# Patient Record
Sex: FEMALE | Race: BLACK OR AFRICAN AMERICAN | Employment: FULL TIME | URBAN - METROPOLITAN AREA
[De-identification: names, ages, dates, MRNs, and addresses within clinical notes are randomized per-mention and may not be internally consistent; named-entity substitution may affect disease eponyms.]

---

## 2018-01-22 ENCOUNTER — OFFICE VISIT (OUTPATIENT)
Dept: ENDOCRINOLOGY | Age: 58
End: 2018-01-22

## 2018-01-22 VITALS
OXYGEN SATURATION: 98 % | SYSTOLIC BLOOD PRESSURE: 106 MMHG | DIASTOLIC BLOOD PRESSURE: 72 MMHG | BODY MASS INDEX: 36.71 KG/M2 | HEART RATE: 82 BPM | WEIGHT: 242.2 LBS | HEIGHT: 68 IN

## 2018-01-22 DIAGNOSIS — E04.9 GOITER: ICD-10-CM

## 2018-01-22 DIAGNOSIS — E06.3 HASHIMOTO'S THYROIDITIS: ICD-10-CM

## 2018-01-22 DIAGNOSIS — E11.40 TYPE 2 DIABETES, CONTROLLED, WITH NEUROPATHY (HCC): Primary | ICD-10-CM

## 2018-01-22 DIAGNOSIS — I10 ESSENTIAL HYPERTENSION: ICD-10-CM

## 2018-01-22 DIAGNOSIS — E78.2 MIXED HYPERLIPIDEMIA: ICD-10-CM

## 2018-01-22 DIAGNOSIS — E55.9 VITAMIN D DEFICIENCY: ICD-10-CM

## 2018-01-22 DIAGNOSIS — E03.9 ACQUIRED HYPOTHYROIDISM: ICD-10-CM

## 2018-01-22 PROCEDURE — 99204 OFFICE O/P NEW MOD 45 MIN: CPT | Performed by: INTERNAL MEDICINE

## 2018-01-22 RX ORDER — PANTOPRAZOLE SODIUM 40 MG/1
40 TABLET, DELAYED RELEASE ORAL DAILY
COMMUNITY
Start: 2017-08-09 | End: 2019-02-01 | Stop reason: ALTCHOICE

## 2018-01-22 RX ORDER — MULTIVIT-MIN/IRON/FOLIC ACID/K 18-600-40
CAPSULE ORAL
COMMUNITY
End: 2018-10-31 | Stop reason: SDUPTHER

## 2018-01-22 RX ORDER — TRIAMCINOLONE ACETONIDE 1 MG/G
CREAM TOPICAL 2 TIMES DAILY
COMMUNITY

## 2018-01-22 RX ORDER — HYDROXYCHLOROQUINE SULFATE 200 MG/1
200 TABLET, FILM COATED ORAL 2 TIMES DAILY
COMMUNITY

## 2018-01-22 RX ORDER — EZETIMIBE 10 MG/1
10 TABLET ORAL DAILY
COMMUNITY
End: 2018-01-22 | Stop reason: SDUPTHER

## 2018-01-22 RX ORDER — LISINOPRIL AND HYDROCHLOROTHIAZIDE 25; 20 MG/1; MG/1
1 TABLET ORAL DAILY
COMMUNITY
Start: 2017-09-28 | End: 2022-04-28

## 2018-01-22 RX ORDER — LEVOTHYROXINE SODIUM 0.03 MG/1
25 TABLET ORAL DAILY
COMMUNITY
Start: 2015-12-31 | End: 2018-01-22 | Stop reason: SDUPTHER

## 2018-01-22 RX ORDER — LEVOTHYROXINE SODIUM 25 MCG
25 TABLET ORAL
Qty: 90 TABLET | Refills: 1 | Status: SHIPPED | OUTPATIENT
Start: 2018-01-22 | End: 2018-04-30 | Stop reason: SDUPTHER

## 2018-01-22 RX ORDER — EZETIMIBE 10 MG/1
10 TABLET ORAL DAILY
Qty: 90 TABLET | Refills: 1 | Status: SHIPPED | OUTPATIENT
Start: 2018-01-22 | End: 2018-04-30 | Stop reason: SDUPTHER

## 2018-01-22 RX ORDER — GABAPENTIN 600 MG/1
600 TABLET ORAL 3 TIMES DAILY
COMMUNITY
Start: 2010-08-30

## 2018-01-22 RX ORDER — TRAMADOL HYDROCHLORIDE 50 MG/1
50 TABLET ORAL PRN
COMMUNITY
Start: 2018-01-02

## 2018-01-22 ASSESSMENT — PATIENT HEALTH QUESTIONNAIRE - PHQ9
1. LITTLE INTEREST OR PLEASURE IN DOING THINGS: 0
SUM OF ALL RESPONSES TO PHQ QUESTIONS 1-9: 0
2. FEELING DOWN, DEPRESSED OR HOPELESS: 0
SUM OF ALL RESPONSES TO PHQ9 QUESTIONS 1 & 2: 0

## 2018-01-22 NOTE — PROGRESS NOTES
normal  (minimum of 5 random plantar locations tested, avoiding callused areas - > 1 area with absence of sensation is + for neuropathy)    Plus at least one of the following:  Pulses: normal,   Pinprick: N/A  Proprioception: Intact  Vibration (128 Hz): Impaired    ASSESSMENT/PLAN:  1. Type 2 diabetes, controlled, with neuropathy (HCC)    - insulin lispro (HUMALOG) 100 UNIT/ML injection vial; In insulin pump, total daily dose 150 units  Dispense: 15 vial; Refill: 1  - Hemoglobin A1C; Future  - HM DIABETES FOOT EXAM  - Comprehensive Metabolic Panel; Future  - Microalbumin / Creatinine Urine Ratio; Future  - Comprehensive Metabolic Panel; Future  - Hemoglobin A1C; Future    2. Mixed hyperlipidemia    - ezetimibe (ZETIA) 10 MG tablet; Take 1 tablet by mouth daily  Dispense: 90 tablet; Refill: 1  - Lipid Panel; Future  - Lipid Panel; Future    3. Hashimoto's thyroiditis  Follow    4. Goiter  Thyroid sono    5. Vitamin D deficiency  Supplements. - Cholecalciferol (VITAMIN D) 2000 units CAPS capsule; Take by mouth  - Vitamin D 25 Hydroxy; Future  - Vitamin D 25 Hydroxy; Future    6. Essential hypertension    - lisinopril-hydrochlorothiazide (PRINZIDE;ZESTORETIC) 20-25 MG per tablet; Take 1 tablet by mouth daily    7. Class 2 obesity with serious comorbidity and body mass index (BMI) of 36.0 to 36.9 in adult, unspecified obesity type  Diet, exercise    8. Acquired hypothyroidism    - SYNTHROID 25 MCG tablet; Take 1 tablet by mouth every morning (before breakfast)  Dispense: 90 tablet; Refill: 1  - T4, Free; Future  - TSH without Reflex; Future      Reviewed and/or ordered clinical lab results Yes  Reviewed and/or ordered radiology tests Yes  Reviewed and/or ordered other diagnostic tests No  Discussed test results with performing physician No  Independently reviewed image, tracing, or specimen Yes Insulin pump data, settings, tracing. Continue same rates and ratios.   Made a decision to obtain old records No  Reviewed old

## 2018-01-31 ENCOUNTER — TELEPHONE (OUTPATIENT)
Dept: ENDOCRINOLOGY | Age: 58
End: 2018-01-31

## 2018-01-31 NOTE — TELEPHONE ENCOUNTER
Mrs. Miranda Benavides would like RX for Novolog and stated her plan is the same and will cover Novolog.

## 2018-01-31 NOTE — TELEPHONE ENCOUNTER
Pt called said  went to  her novolog from [id number] and was told that there isn't an order for Novolog but for something else. She said she was just in to see Dr. Xavier Gardner and was not told of any change to her medication.  She would like an order for Novolog sent to [facility], if she is needed she can be reached at [phone]

## 2018-04-30 ENCOUNTER — OFFICE VISIT (OUTPATIENT)
Dept: ENDOCRINOLOGY | Age: 58
End: 2018-04-30

## 2018-04-30 VITALS
HEART RATE: 88 BPM | DIASTOLIC BLOOD PRESSURE: 74 MMHG | HEIGHT: 68 IN | SYSTOLIC BLOOD PRESSURE: 124 MMHG | BODY MASS INDEX: 36.49 KG/M2 | OXYGEN SATURATION: 95 % | WEIGHT: 240.8 LBS

## 2018-04-30 DIAGNOSIS — I10 ESSENTIAL HYPERTENSION: ICD-10-CM

## 2018-04-30 DIAGNOSIS — E55.9 VITAMIN D DEFICIENCY: ICD-10-CM

## 2018-04-30 DIAGNOSIS — E78.2 MIXED HYPERLIPIDEMIA: ICD-10-CM

## 2018-04-30 DIAGNOSIS — E06.3 HASHIMOTO'S THYROIDITIS: ICD-10-CM

## 2018-04-30 DIAGNOSIS — E03.9 ACQUIRED HYPOTHYROIDISM: ICD-10-CM

## 2018-04-30 DIAGNOSIS — E04.9 GOITER: ICD-10-CM

## 2018-04-30 DIAGNOSIS — E11.40 TYPE 2 DIABETES, CONTROLLED, WITH NEUROPATHY (HCC): Primary | ICD-10-CM

## 2018-04-30 PROCEDURE — 99214 OFFICE O/P EST MOD 30 MIN: CPT | Performed by: INTERNAL MEDICINE

## 2018-04-30 RX ORDER — LEVOTHYROXINE SODIUM 50 MCG
50 TABLET ORAL
Qty: 90 TABLET | Refills: 1 | Status: SHIPPED | OUTPATIENT
Start: 2018-04-30 | End: 2018-07-30 | Stop reason: SDUPTHER

## 2018-04-30 RX ORDER — EZETIMIBE 10 MG/1
10 TABLET ORAL DAILY
Qty: 90 TABLET | Refills: 1 | Status: SHIPPED | OUTPATIENT
Start: 2018-04-30 | End: 2018-07-30 | Stop reason: SDUPTHER

## 2018-04-30 RX ORDER — LEVOTHYROXINE SODIUM 25 MCG
25 TABLET ORAL
Qty: 90 TABLET | Refills: 1 | Status: SHIPPED | OUTPATIENT
Start: 2018-04-30 | End: 2018-04-30

## 2018-04-30 ASSESSMENT — PATIENT HEALTH QUESTIONNAIRE - PHQ9
2. FEELING DOWN, DEPRESSED OR HOPELESS: 0
1. LITTLE INTEREST OR PLEASURE IN DOING THINGS: 0
SUM OF ALL RESPONSES TO PHQ QUESTIONS 1-9: 0
SUM OF ALL RESPONSES TO PHQ9 QUESTIONS 1 & 2: 0

## 2018-06-28 ENCOUNTER — TELEPHONE (OUTPATIENT)
Dept: ENDOCRINOLOGY | Age: 58
End: 2018-06-28

## 2018-06-28 DIAGNOSIS — E11.40 TYPE 2 DIABETES, CONTROLLED, WITH NEUROPATHY (HCC): ICD-10-CM

## 2018-06-29 DIAGNOSIS — E11.40 TYPE 2 DIABETES, CONTROLLED, WITH NEUROPATHY (HCC): ICD-10-CM

## 2018-06-29 RX ORDER — PERPHENAZINE 16 MG/1
1 TABLET, FILM COATED ORAL
Qty: 550 EACH | Refills: 3 | Status: SHIPPED | OUTPATIENT
Start: 2018-06-29 | End: 2019-02-01 | Stop reason: SDUPTHER

## 2018-07-30 ENCOUNTER — OFFICE VISIT (OUTPATIENT)
Dept: ENDOCRINOLOGY | Age: 58
End: 2018-07-30

## 2018-07-30 VITALS
OXYGEN SATURATION: 99 % | WEIGHT: 237 LBS | BODY MASS INDEX: 35.92 KG/M2 | DIASTOLIC BLOOD PRESSURE: 74 MMHG | HEIGHT: 68 IN | HEART RATE: 94 BPM | SYSTOLIC BLOOD PRESSURE: 110 MMHG

## 2018-07-30 DIAGNOSIS — E11.40 TYPE 2 DIABETES, CONTROLLED, WITH NEUROPATHY (HCC): Primary | ICD-10-CM

## 2018-07-30 DIAGNOSIS — E55.9 VITAMIN D DEFICIENCY: ICD-10-CM

## 2018-07-30 DIAGNOSIS — E03.9 ACQUIRED HYPOTHYROIDISM: ICD-10-CM

## 2018-07-30 DIAGNOSIS — I10 ESSENTIAL HYPERTENSION: ICD-10-CM

## 2018-07-30 DIAGNOSIS — E04.9 GOITER: ICD-10-CM

## 2018-07-30 DIAGNOSIS — E06.3 HASHIMOTO'S THYROIDITIS: ICD-10-CM

## 2018-07-30 DIAGNOSIS — E78.2 MIXED HYPERLIPIDEMIA: ICD-10-CM

## 2018-07-30 PROCEDURE — 99214 OFFICE O/P EST MOD 30 MIN: CPT | Performed by: INTERNAL MEDICINE

## 2018-07-30 RX ORDER — NORTRIPTYLINE HYDROCHLORIDE 10 MG/1
40 CAPSULE ORAL NIGHTLY
COMMUNITY

## 2018-07-30 RX ORDER — LEVOTHYROXINE SODIUM 50 MCG
50 TABLET ORAL
Qty: 90 TABLET | Refills: 1 | Status: SHIPPED | OUTPATIENT
Start: 2018-07-30 | End: 2018-10-31 | Stop reason: SDUPTHER

## 2018-07-30 RX ORDER — SIMVASTATIN 20 MG
20 TABLET ORAL EVERY EVENING
Qty: 30 TABLET | Refills: 3
Start: 2018-07-30 | End: 2019-05-03 | Stop reason: SDUPTHER

## 2018-07-30 RX ORDER — PHENTERMINE HYDROCHLORIDE 37.5 MG/1
37.5 TABLET ORAL
Qty: 30 TABLET | Refills: 0 | Status: SHIPPED | OUTPATIENT
Start: 2018-07-30 | End: 2018-08-29

## 2018-07-30 RX ORDER — EZETIMIBE 10 MG/1
10 TABLET ORAL DAILY
Qty: 90 TABLET | Refills: 1 | Status: SHIPPED | OUTPATIENT
Start: 2018-07-30 | End: 2019-02-01 | Stop reason: SDUPTHER

## 2018-07-30 ASSESSMENT — PATIENT HEALTH QUESTIONNAIRE - PHQ9
SUM OF ALL RESPONSES TO PHQ9 QUESTIONS 1 & 2: 0
SUM OF ALL RESPONSES TO PHQ QUESTIONS 1-9: 0
2. FEELING DOWN, DEPRESSED OR HOPELESS: 0
1. LITTLE INTEREST OR PLEASURE IN DOING THINGS: 0

## 2018-07-30 NOTE — PROGRESS NOTES
echotexture.  No discrete nodules or cysts seen.        The left thyroid lobe measures 5.0 x 1.5 x 1.2 cm and demonstrates homogeneous echotexture.  No discrete nodules or cysts seen.        Thyroid isthmus measures 0.2 cm and demonstrates homogeneous echotexture.  No discrete nodules or cysts seen.        No cervical lymphadenopathy noted. Past Medical History:   Diagnosis Date    Asthma     Depression     Fibromyalgia     HLP (hyperkeratosis lenticularis perstans)     Hypertension     Muscle weakness     Neuropathy (HCC)     Type 2 diabetes mellitus without complication (HCC)     Weight gain       Patient Active Problem List   Diagnosis    Type 2 diabetes, controlled, with neuropathy ([address]    Mixed hyperlipidemia    Hashimoto's thyroiditis    Goiter    Vitamin D deficiency    Essential hypertension    Class 2 obesity with serious comorbidity and body mass index (BMI) of 36.0 to 36.9 in adult    Acquired hypothyroidism     Past Surgical History:   Procedure Laterality Date    COLONOSCOPY      DILATION AND CURETTAGE OF UTERUS      ERCP      URETER STENT PLACEMENT       Social History     Social History    Marital status:      Spouse name: N/A    Number of children: N/A    Years of education: N/A     Occupational History    Not on file.      Social History Main Topics    Smoking status: Former Smoker     Years: 20.00     Types: Cigarettes    Smokeless tobacco: Never Used    Alcohol use 1.2 oz/week     2 Glasses of wine per week    Drug use: No    Sexual activity: Yes     Partners: Male     Other Topics Concern    Not on file     Social History Narrative    No narrative on file     Family History   Problem Relation Age of Onset    Heart Disease Mother     Diabetes Mother     Other Father 66        Lymphoma    Diabetes Sister     Other Sister         HLP    High Blood Pressure Brother     No Known Problems Brother     Other Brother 52        Suicide    Asthma throat, no earache, no decrease in hearing, no hoarseness, no dry mouth, no sinus problems, no difficulty swallowing, no neck lumps, no dental problems, no mouth sores, no ringing in ears  Pulmonary: no shortness of breath, no wheezing, no dyspnea on exertion, no cough  Cardiovascular: no chest pain, no lower extremity edema, no orthopnea, no intermittent leg claudication, no palpitations  Gastrointestinal: no abdominal pain, no nausea, no vomiting, no diarrhea, no constipation, no dysphagia, no heartburn, no bloating  Genitourinary: no dysuria, no urinary incontinence, no urinary hesitancy, no urinary frequency, no feelings of urinary urgency, no nocturia  Musculoskeletal: has joint swelling, has joint stiffness, has joint pain, no muscle cramps, has muscle pain  Integument/Breast: no hair loss, no skin rashes, no skin lesions, no itching, no dry skin, no breast pain, no breast mass, no skin hives, no skin discoloration, no nipple discharge  Neurological: no numbness, no tingling, no weakness, no confusion, no headaches, no dizziness, no fainting, no tremors, no decrease in memory, no balance problems  Psychiatric: no anxiety, no depression, no insomnia  Hematologic/Lymphatic: no tendency for easy bleeding, no swollen lymph nodes, no tendency for easy bruising  Immunology: no seasonal allergies, no frequent infections, no frequent illnesses  Endocrine: has temperature intolerance, no hirsutism, has hot flashes    OBJECTIVE:  /74 (Site: Left Arm, Position: Sitting, Cuff Size: Large Adult)   Pulse 94   Ht 5' 8\" (1.727 m)   Wt 237 lb (107.5 kg)   SpO2 99%   BMI 36.04 kg/m²      Constitutional: no acute distress, well appearing and well nourished  Psychiatric: oriented to person, place and time, judgement and insight and normal, recent and remote memory and intact and mood and affect are normal  Skin: skin and subcutaneous tissue is normal without mass, normal turgor  Head and Face: examination of head and face revealed no abnormalities  Eyes: no lid or conjunctival swelling, erythema or discharge, pupils are normal, equal, round, reactive to light  Ears/Nose: external inspection of ears and nose revealed no abnormalities, hearing is grossly normal  Oropharynx/Mouth/Face: lips, tongue and gums are normal with no lesions, the voice quality was normal  Neck: neck is supple and symmetric, with midline trachea and no masses, thyroid is normal  Lymphatics: normal cervical lymph nodes, normal supraclavicular nodes  Pulmonary: no increased work of breathing or signs of respiratory distress, lungs are clear to auscultation  Cardiovascular: normal heart rate and rhythm, normal S1 and S2, no murmurs and pedal pulses and 2+ bilaterally, No edema  Abdomen: abdomen is soft, non-tender with no masses  Musculoskeletal: normal gait and station and exam of the digits and nails are normal  Neurological: normal coordination and normal general cortical function    Visual inspection:  Deformity/amputation: has hammer toes, no amputation  Skin lesions/pre-ulcerative calluses: absent  Edema: right- negative, left- negative      ASSESSMENT/PLAN:  1. Type 2 diabetes, controlled, with neuropathy (HCC)    - insulin lispro (HUMALOG) 100 UNIT/ML injection vial; In insulin pump, total daily dose 150 units    - Hemoglobin A1C; Future  - Comprehensive Metabolic Panel; Future    2. Mixed hyperlipidemia  - simvastatin  - ezetimibe (ZETIA) 10 MG tablet; Take 1 tablet by mouth daily    - Lipid Panel; Future    3. Hashimoto's thyroiditis  Follow    4. Goiter  Thyroid sono    5. Vitamin D deficiency  Supplements. - Cholecalciferol (VITAMIN D) 2000 units CAPS capsule; Take by mouth  - Vitamin D 25 Hydroxy; Future    6. Essential hypertension    - lisinopril-hydrochlorothiazide (PRINZIDE;ZESTORETIC) 20-25 MG per tablet; Take 1 tablet by mouth daily    7.  Class 2 obesity with serious comorbidity and body mass index (BMI) of 36.0 to 36.9 in adult, unspecified obesity type  Diet, exercise  Start phentermine     8. Acquired hypothyroidism  Synthroid to 0.05 mg.  - SYNTHROID 50 MCG tablet; Take 1 tablet by mouth every morning (before breakfast)    - T4, Free; Future  - TSH without Reflex; Future    Reviewed and/or ordered clinical lab results Yes  Reviewed and/or ordered radiology tests Yes  Reviewed and/or ordered other diagnostic tests No  Discussed test results with performing physician No  Independently reviewed image, tracing, or specimen Yes Insulin pump data, settings, tracing, total 19 pages. See scanned report. Continue same rates and ratios. Made a decision to obtain old records No  Reviewed old records Yes  Obtained history from other than patient No    Ramonita Desouza was counseled regarding symptoms of diabetes diagnosis, weight managementcourse and complications of disease if inadequately treated, side effects of medications, diagnosis, treatment options, and prognosis, risks, benefits, complications, and alternatives of treatment, labs, imaging and other studies and treatment targets and goals. She understands instructions and counseling. Return in about 3 months (around 10/30/2018) for diabetes, thyroid problems.

## 2018-08-29 ENCOUNTER — OFFICE VISIT (OUTPATIENT)
Dept: ENDOCRINOLOGY | Age: 58
End: 2018-08-29

## 2018-08-29 VITALS
SYSTOLIC BLOOD PRESSURE: 126 MMHG | HEART RATE: 99 BPM | DIASTOLIC BLOOD PRESSURE: 72 MMHG | OXYGEN SATURATION: 98 % | HEIGHT: 68 IN | WEIGHT: 231.4 LBS | BODY MASS INDEX: 35.07 KG/M2

## 2018-08-29 PROBLEM — E66.812 CLASS 2 OBESITY IN ADULT: Status: ACTIVE | Noted: 2018-01-22

## 2018-08-29 PROBLEM — E66.9 CLASS 2 OBESITY IN ADULT: Status: ACTIVE | Noted: 2018-01-22

## 2018-08-29 PROCEDURE — 99213 OFFICE O/P EST LOW 20 MIN: CPT | Performed by: INTERNAL MEDICINE

## 2018-08-29 RX ORDER — PHENTERMINE HYDROCHLORIDE 37.5 MG/1
37.5 TABLET ORAL
Qty: 30 TABLET | Refills: 0 | Status: SHIPPED | OUTPATIENT
Start: 2018-08-29 | End: 2018-09-25 | Stop reason: SDUPTHER

## 2018-08-29 RX ORDER — ESOMEPRAZOLE MAGNESIUM 40 MG/1
1 CAPSULE, DELAYED RELEASE ORAL
COMMUNITY
Start: 2018-08-28 | End: 2018-10-31 | Stop reason: ALTCHOICE

## 2018-08-29 ASSESSMENT — PATIENT HEALTH QUESTIONNAIRE - PHQ9
SUM OF ALL RESPONSES TO PHQ9 QUESTIONS 1 & 2: 0
1. LITTLE INTEREST OR PLEASURE IN DOING THINGS: 0
2. FEELING DOWN, DEPRESSED OR HOPELESS: 0
SUM OF ALL RESPONSES TO PHQ QUESTIONS 1-9: 0
SUM OF ALL RESPONSES TO PHQ QUESTIONS 1-9: 0

## 2018-08-29 NOTE — PROGRESS NOTES
Keri Robertson is a 63 y.o. female who presents for Type 2 diabetes mellitus. Current symptoms/problems include weight problem and are worsening. 1.Class 2 obesity with serious comorbidity and body mass index (BMI) of 35.0 to 35.9 in adult, unspecified obesity type [E66.9, Z68.35]     Eats healthy. Exercises. Tried low carb diet, eats healthy, unsuccessful with weight loss. Started phentermine 7/30/2018. Weight 237 lbs 7/30/2018. Weight 231 lbs 8/29/2018    No results found for: LABA1C  No results found for: EAG        Past Medical History:   Diagnosis Date    Asthma     Depression     Fibromyalgia     HLP (hyperkeratosis lenticularis perstans)     Hypertension     Muscle weakness     Neuropathy     Type 2 diabetes mellitus without complication ([facility])     Weight gain       Patient Active Problem List   Diagnosis    Type 2 diabetes, controlled, with neuropathy ([address]    Mixed hyperlipidemia    Hashimoto's thyroiditis    Goiter    Vitamin D deficiency    Essential hypertension    Class 2 obesity in adult    Acquired hypothyroidism     Past Surgical History:   Procedure Laterality Date    COLONOSCOPY      DILATION AND CURETTAGE OF UTERUS      ERCP      URETER STENT PLACEMENT       Social History     Social History    Marital status:      Spouse name: N/A    Number of children: N/A    Years of education: N/A     Occupational History    Not on file.      Social History Main Topics    Smoking status: Former Smoker     Years: 20.00     Types: Cigarettes    Smokeless tobacco: Never Used    Alcohol use 1.2 oz/week     2 Glasses of wine per week    Drug use: No    Sexual activity: Yes     Partners: Male     Other Topics Concern    Not on file     Social History Narrative    No narrative on file     Family History   Problem Relation Age of Onset    Heart Disease Mother     Diabetes Mother     Other Father 66        Lymphoma    Diabetes Sister     Other Sister         HLP    High Blood Pressure Brother     No Known Problems Brother     Other Brother 52        Suicide    Asthma Daughter     Other Daughter         Chiari Malformation     Current Outpatient Prescriptions   Medication Sig Dispense Refill    esomeprazole (NEXIUM) 40 MG delayed release capsule Take 1 capsule by mouth      phentermine (ADIPEX-P) 37.5 MG tablet Take 1 tablet by mouth every morning (before breakfast) for 30 days. . 30 tablet 0    nortriptyline (PAMELOR) 10 MG capsule Take 10 mg by mouth nightly      SYNTHROID 50 MCG tablet Take 1 tablet by mouth every morning (before breakfast) 90 tablet 1    insulin aspart (NOVOLOG) 100 UNIT/ML injection vial In insulin pump, total daily dose 150 units 15 vial 1    ezetimibe (ZETIA) 10 MG tablet Take 1 tablet by mouth daily 90 tablet 1    simvastatin (ZOCOR) 20 MG tablet Take 1 tablet by mouth every evening 30 tablet 3    CONTOUR NEXT TEST strip 1 each by In Vitro route 6 times daily As needed. 550 each 3    gabapentin (NEURONTIN) 600 MG tablet Take 600 mg by mouth 4 times daily.  traMADol (ULTRAM) 50 MG tablet Take 50 mg by mouth daily.  lisinopril-hydrochlorothiazide (PRINZIDE;ZESTORETIC) 20-25 MG per tablet Take 1 tablet by mouth daily      MULTIPLE VITAMIN PO Take 1 tablet by mouth daily      hydroxychloroquine (PLAQUENIL) 200 MG tablet Take 200 mg by mouth 2 times daily      Inulin (FIBER CHOICE PO) Take by mouth      triamcinolone (KENALOG) 0.1 % cream Apply topically 2 times daily Apply topically 2 times daily.  Cholecalciferol (VITAMIN D) 2000 units CAPS capsule Take by mouth      pantoprazole (PROTONIX) 40 MG tablet Take 40 mg by mouth daily       No current facility-administered medications for this visit.       Allergies   Allergen Reactions    Sulfa Antibiotics Hives    Hydrocodone-Acetaminophen Nausea And Vomiting     Family Status   Relation Status    Mother     Father     Sister (Not Specified)   Alpa [place] Brother Alive

## 2018-08-30 LAB — DIABETIC RETINOPATHY: NEGATIVE

## 2018-09-25 ENCOUNTER — OFFICE VISIT (OUTPATIENT)
Dept: ENDOCRINOLOGY | Age: 58
End: 2018-09-25
Payer: COMMERCIAL

## 2018-09-25 VITALS
SYSTOLIC BLOOD PRESSURE: 104 MMHG | OXYGEN SATURATION: 97 % | DIASTOLIC BLOOD PRESSURE: 62 MMHG | HEIGHT: 68 IN | BODY MASS INDEX: 34.98 KG/M2 | WEIGHT: 230.8 LBS | HEART RATE: 91 BPM

## 2018-09-25 PROCEDURE — 99214 OFFICE O/P EST MOD 30 MIN: CPT | Performed by: INTERNAL MEDICINE

## 2018-09-25 RX ORDER — PHENTERMINE HYDROCHLORIDE 37.5 MG/1
37.5 TABLET ORAL
Qty: 30 TABLET | Refills: 0 | Status: SHIPPED | OUTPATIENT
Start: 2018-09-25 | End: 2018-10-25

## 2018-09-25 ASSESSMENT — PATIENT HEALTH QUESTIONNAIRE - PHQ9
2. FEELING DOWN, DEPRESSED OR HOPELESS: 0
SUM OF ALL RESPONSES TO PHQ9 QUESTIONS 1 & 2: 0
SUM OF ALL RESPONSES TO PHQ QUESTIONS 1-9: 0
1. LITTLE INTEREST OR PLEASURE IN DOING THINGS: 0
SUM OF ALL RESPONSES TO PHQ QUESTIONS 1-9: 0

## 2018-09-25 NOTE — PROGRESS NOTES
Ashly Harris is a 63 y.o. female who presents for Type 2 diabetes mellitus. Current symptoms/problems include weight problem and are worsening. 1.Class 2 obesity with serious comorbidity and body mass index (BMI) of 35.0 to 35.9 in adult, unspecified obesity type [E66.9, Z68.35]     She does not feel well, has muscle pain, body aches, relates to fibromyalgia. Eats healthy. Exercises. Tried low carb diet, eats healthy, unsuccessful with weight loss. No other weigh loss medications. Started phentermine 7/30/2018. Weight 237 lbs 7/30/2018. Weight 231 lbs 8/29/2018  Weight 230 lbs 12.8 oz on 9/25/2018    No results found for: LABA1C  No results found for: EAG        Past Medical History:   Diagnosis Date    Asthma     Depression     Fibromyalgia     HLP (hyperkeratosis lenticularis perstans)     Hypertension     Muscle weakness     Neuropathy     Type 2 diabetes mellitus without complication (HCC)     Weight gain       Patient Active Problem List   Diagnosis    Type 2 diabetes, controlled, with neuropathy ([address]    Mixed hyperlipidemia    Hashimoto's thyroiditis    Goiter    Vitamin D deficiency    Essential hypertension    Class 2 obesity in adult    Acquired hypothyroidism     Past Surgical History:   Procedure Laterality Date    COLONOSCOPY      DILATION AND CURETTAGE OF UTERUS      ERCP      URETER STENT PLACEMENT       Social History     Social History    Marital status:      Spouse name: N/A    Number of children: N/A    Years of education: N/A     Occupational History    Not on file.      Social History Main Topics    Smoking status: Former Smoker     Years: 20.00     Types: Cigarettes    Smokeless tobacco: Never Used    Alcohol use 1.2 oz/week     2 Glasses of wine per week    Drug use: No    Sexual activity: Yes     Partners: Male     Other Topics Concern    Not on file     Social History Narrative    No narrative on file     Family History   Problem Relation Age of Onset    Heart Disease Mother     Diabetes Mother    Grace Heredia Other Father 66        Lymphoma    Diabetes Sister     Other Sister         HLP    High Blood Pressure Brother     No Known Problems Brother     Other Brother 52        Suicide    Asthma Daughter     Other Daughter         Chiari Malformation     Current Outpatient Prescriptions   Medication Sig Dispense Refill    phentermine (ADIPEX-P) 37.5 MG tablet Take 1 tablet by mouth every morning (before breakfast) for 30 days. . 30 tablet 0    esomeprazole (NEXIUM) 40 MG delayed release capsule Take 1 capsule by mouth      nortriptyline (PAMELOR) 10 MG capsule Take 10 mg by mouth nightly      SYNTHROID 50 MCG tablet Take 1 tablet by mouth every morning (before breakfast) 90 tablet 1    insulin aspart (NOVOLOG) 100 UNIT/ML injection vial In insulin pump, total daily dose 150 units 15 vial 1    ezetimibe (ZETIA) 10 MG tablet Take 1 tablet by mouth daily 90 tablet 1    simvastatin (ZOCOR) 20 MG tablet Take 1 tablet by mouth every evening 30 tablet 3    CONTOUR NEXT TEST strip 1 each by In Vitro route 6 times daily As needed. 550 each 3    gabapentin (NEURONTIN) 600 MG tablet Take 600 mg by mouth 4 times daily.  traMADol (ULTRAM) 50 MG tablet Take 50 mg by mouth daily.  lisinopril-hydrochlorothiazide (PRINZIDE;ZESTORETIC) 20-25 MG per tablet Take 1 tablet by mouth daily      MULTIPLE VITAMIN PO Take 1 tablet by mouth daily      hydroxychloroquine (PLAQUENIL) 200 MG tablet Take 200 mg by mouth 2 times daily      Inulin (FIBER CHOICE PO) Take by mouth      triamcinolone (KENALOG) 0.1 % cream Apply topically 2 times daily Apply topically 2 times daily.  Cholecalciferol (VITAMIN D) 2000 units CAPS capsule Take by mouth      pantoprazole (PROTONIX) 40 MG tablet Take 40 mg by mouth daily       No current facility-administered medications for this visit.       Allergies   Allergen Reactions    Sulfa Antibiotics Hives    104/62 (Site: Left Upper Arm, Position: Sitting, Cuff Size: Large Adult)   Pulse 91   Ht 5' 8\" (1.727 m)   Wt 230 lb 12.8 oz (104.7 kg)   SpO2 97%   BMI 35.09 kg/m²      Constitutional: no acute distress, well appearing and well nourished  Psychiatric: oriented to person, place and time, judgement and insight and normal, recent and remote memory and intact and mood and affect are normal  Skin: skin and subcutaneous tissue is normal without mass, normal turgor  Head and Face: examination of head and face revealed no abnormalities  Eyes: no lid or conjunctival swelling, erythema or discharge, pupils are normal, equal, round, reactive to light  Ears/Nose: external inspection of ears and nose revealed no abnormalities, hearing is grossly normal  Oropharynx/Mouth/Face: lips, tongue and gums are normal with no lesions, the voice quality was normal  Neck: neck is supple and symmetric, with midline trachea and no masses, thyroid is normal  Lymphatics: normal cervical lymph nodes, normal supraclavicular nodes  Pulmonary: no increased work of breathing or signs of respiratory distress, lungs are clear to auscultation  Cardiovascular: normal heart rate and rhythm, normal S1 and S2, no murmurs and pedal pulses and 2+ bilaterally, No edema  Abdomen: abdomen is soft, non-tender with no masses  Musculoskeletal: normal gait and station and exam of the digits and nails are normal  Neurological: normal coordination and normal general cortical function    Visual inspection:  Deformity/amputation: has hammer toes, no amputation  Skin lesions/pre-ulcerative calluses: absent  Edema: right- negative, left- negative      ASSESSMENT/PLAN:    1. Class 2 obesity with serious comorbidity and body mass index (BMI) of 35.0 to 35.9 in adult, unspecified obesity type  Diet, exercise  Change basal rates. Started phentermine 7/30/2018. Weight 237 lbs 7/30/2018.    Weight 231 lbs 8/29/2018  Weight 230 lbs 12.8 oz on 9/25/2018    Reviewed and/or

## 2018-10-31 ENCOUNTER — OFFICE VISIT (OUTPATIENT)
Dept: ENDOCRINOLOGY | Age: 58
End: 2018-10-31
Payer: COMMERCIAL

## 2018-10-31 VITALS
SYSTOLIC BLOOD PRESSURE: 134 MMHG | HEART RATE: 84 BPM | DIASTOLIC BLOOD PRESSURE: 71 MMHG | OXYGEN SATURATION: 100 % | HEIGHT: 68 IN | WEIGHT: 230 LBS | BODY MASS INDEX: 34.86 KG/M2

## 2018-10-31 DIAGNOSIS — E66.9 CLASS 1 OBESITY WITH SERIOUS COMORBIDITY AND BODY MASS INDEX (BMI) OF 34.0 TO 34.9 IN ADULT, UNSPECIFIED OBESITY TYPE: ICD-10-CM

## 2018-10-31 DIAGNOSIS — E55.9 VITAMIN D DEFICIENCY: ICD-10-CM

## 2018-10-31 DIAGNOSIS — E03.9 ACQUIRED HYPOTHYROIDISM: ICD-10-CM

## 2018-10-31 DIAGNOSIS — I10 ESSENTIAL HYPERTENSION: ICD-10-CM

## 2018-10-31 DIAGNOSIS — E78.2 MIXED HYPERLIPIDEMIA: ICD-10-CM

## 2018-10-31 DIAGNOSIS — E06.3 HASHIMOTO'S THYROIDITIS: ICD-10-CM

## 2018-10-31 DIAGNOSIS — E11.40 TYPE 2 DIABETES, CONTROLLED, WITH NEUROPATHY (HCC): Primary | ICD-10-CM

## 2018-10-31 DIAGNOSIS — E04.9 GOITER: ICD-10-CM

## 2018-10-31 PROCEDURE — 99214 OFFICE O/P EST MOD 30 MIN: CPT | Performed by: INTERNAL MEDICINE

## 2018-10-31 RX ORDER — LEVOTHYROXINE SODIUM 50 MCG
50 TABLET ORAL
Qty: 90 TABLET | Refills: 1 | Status: SHIPPED | OUTPATIENT
Start: 2018-10-31 | End: 2019-02-01 | Stop reason: SDUPTHER

## 2018-10-31 RX ORDER — MULTIVIT-MIN/IRON/FOLIC ACID/K 18-600-40
CAPSULE ORAL
Qty: 30 CAPSULE | Refills: 0
Start: 2018-10-31

## 2018-10-31 NOTE — PROGRESS NOTES
Outpatient Prescriptions   Medication Sig Dispense Refill    Cholecalciferol (VITAMIN D) 2000 units CAPS capsule Take 1 tablet alternating with 2 tablets every other day 30 capsule 0    SYNTHROID 50 MCG tablet Take 1 tablet by mouth every morning (before breakfast) 90 tablet 1    nortriptyline (PAMELOR) 10 MG capsule Take 10 mg by mouth nightly      insulin aspart (NOVOLOG) 100 UNIT/ML injection vial In insulin pump, total daily dose 150 units 15 vial 1    ezetimibe (ZETIA) 10 MG tablet Take 1 tablet by mouth daily 90 tablet 1    simvastatin (ZOCOR) 20 MG tablet Take 1 tablet by mouth every evening 30 tablet 3    CONTOUR NEXT TEST strip 1 each by In Vitro route 6 times daily As needed. 550 each 3    gabapentin (NEURONTIN) 600 MG tablet Take 600 mg by mouth 4 times daily.  traMADol (ULTRAM) 50 MG tablet Take 50 mg by mouth daily.  MULTIPLE VITAMIN PO Take 1 tablet by mouth daily      hydroxychloroquine (PLAQUENIL) 200 MG tablet Take 200 mg by mouth 2 times daily      Inulin (FIBER CHOICE PO) Take by mouth      triamcinolone (KENALOG) 0.1 % cream Apply topically 2 times daily Apply topically 2 times daily.  lisinopril-hydrochlorothiazide (PRINZIDE;ZESTORETIC) 20-25 MG per tablet Take 1 tablet by mouth daily      pantoprazole (PROTONIX) 40 MG tablet Take 40 mg by mouth daily       No current facility-administered medications for this visit.       Allergies   Allergen Reactions    Sulfa Antibiotics Hives    Hydrocodone-Acetaminophen Nausea And Vomiting     Family Status   Relation Status    Mother     Father    [facility] Sister (Not Specified)    Brother Alive    Brother Alive    Brother     Suzan Alive       Review of Systems  Constitutional: has fatigue, no fever, no recent weight gain, no recent weight loss, no changes in appetite  Eyes: no eye pain, no change in vision, no eye redness, no eye irritation, no double vision  Ears, nose, throat: no nasal congestion, no

## 2019-01-31 PROBLEM — E66.811 CLASS 1 OBESITY WITH BODY MASS INDEX (BMI) OF 34.0 TO 34.9 IN ADULT: Status: ACTIVE | Noted: 2018-01-22

## 2019-02-01 ENCOUNTER — OFFICE VISIT (OUTPATIENT)
Dept: ENDOCRINOLOGY | Age: 59
End: 2019-02-01
Payer: COMMERCIAL

## 2019-02-01 VITALS
WEIGHT: 229.4 LBS | HEART RATE: 92 BPM | DIASTOLIC BLOOD PRESSURE: 79 MMHG | SYSTOLIC BLOOD PRESSURE: 133 MMHG | HEIGHT: 68 IN | BODY MASS INDEX: 34.77 KG/M2

## 2019-02-01 DIAGNOSIS — E11.40 TYPE 2 DIABETES, CONTROLLED, WITH NEUROPATHY (HCC): Primary | ICD-10-CM

## 2019-02-01 DIAGNOSIS — E78.2 MIXED HYPERLIPIDEMIA: ICD-10-CM

## 2019-02-01 DIAGNOSIS — E06.3 HASHIMOTO'S THYROIDITIS: ICD-10-CM

## 2019-02-01 DIAGNOSIS — I10 ESSENTIAL HYPERTENSION: ICD-10-CM

## 2019-02-01 DIAGNOSIS — E04.9 GOITER: ICD-10-CM

## 2019-02-01 DIAGNOSIS — E03.9 ACQUIRED HYPOTHYROIDISM: ICD-10-CM

## 2019-02-01 DIAGNOSIS — E55.9 VITAMIN D DEFICIENCY: ICD-10-CM

## 2019-02-01 DIAGNOSIS — E66.9 CLASS 1 OBESITY WITH SERIOUS COMORBIDITY AND BODY MASS INDEX (BMI) OF 34.0 TO 34.9 IN ADULT, UNSPECIFIED OBESITY TYPE: ICD-10-CM

## 2019-02-01 PROCEDURE — 99214 OFFICE O/P EST MOD 30 MIN: CPT | Performed by: INTERNAL MEDICINE

## 2019-02-01 RX ORDER — PERPHENAZINE 16 MG/1
TABLET, FILM COATED ORAL
Qty: 900 EACH | Refills: 5 | Status: SHIPPED | OUTPATIENT
Start: 2019-02-01 | End: 2019-08-02

## 2019-02-01 RX ORDER — EZETIMIBE 10 MG/1
10 TABLET ORAL DAILY
Qty: 90 TABLET | Refills: 1 | Status: SHIPPED | OUTPATIENT
Start: 2019-02-01 | End: 2019-05-03 | Stop reason: SDUPTHER

## 2019-02-01 RX ORDER — LEVOTHYROXINE SODIUM 50 MCG
50 TABLET ORAL
Qty: 90 TABLET | Refills: 1 | Status: SHIPPED | OUTPATIENT
Start: 2019-02-01 | End: 2019-08-02

## 2019-02-01 RX ORDER — PERPHENAZINE 16 MG/1
1 TABLET, FILM COATED ORAL
Qty: 550 EACH | Refills: 3 | Status: CANCELLED | OUTPATIENT
Start: 2019-02-01

## 2019-05-03 ENCOUNTER — OFFICE VISIT (OUTPATIENT)
Dept: ENDOCRINOLOGY | Age: 59
End: 2019-05-03
Payer: COMMERCIAL

## 2019-05-03 VITALS
WEIGHT: 232.6 LBS | DIASTOLIC BLOOD PRESSURE: 74 MMHG | HEIGHT: 68 IN | HEART RATE: 100 BPM | SYSTOLIC BLOOD PRESSURE: 133 MMHG | OXYGEN SATURATION: 99 % | BODY MASS INDEX: 35.25 KG/M2

## 2019-05-03 DIAGNOSIS — I10 ESSENTIAL HYPERTENSION: ICD-10-CM

## 2019-05-03 DIAGNOSIS — E04.9 GOITER: ICD-10-CM

## 2019-05-03 DIAGNOSIS — E55.9 VITAMIN D DEFICIENCY: ICD-10-CM

## 2019-05-03 DIAGNOSIS — E03.9 ACQUIRED HYPOTHYROIDISM: ICD-10-CM

## 2019-05-03 DIAGNOSIS — E11.40 TYPE 2 DIABETES, CONTROLLED, WITH NEUROPATHY (HCC): Primary | ICD-10-CM

## 2019-05-03 DIAGNOSIS — E06.3 HASHIMOTO'S THYROIDITIS: ICD-10-CM

## 2019-05-03 DIAGNOSIS — E78.2 MIXED HYPERLIPIDEMIA: ICD-10-CM

## 2019-05-03 DIAGNOSIS — E66.01 CLASS 2 SEVERE OBESITY WITH SERIOUS COMORBIDITY AND BODY MASS INDEX (BMI) OF 35.0 TO 35.9 IN ADULT, UNSPECIFIED OBESITY TYPE (HCC): ICD-10-CM

## 2019-05-03 PROCEDURE — 99215 OFFICE O/P EST HI 40 MIN: CPT | Performed by: INTERNAL MEDICINE

## 2019-05-03 RX ORDER — EZETIMIBE 10 MG/1
10 TABLET ORAL DAILY
Qty: 90 TABLET | Refills: 1 | Status: SHIPPED | OUTPATIENT
Start: 2019-05-03 | End: 2020-06-23 | Stop reason: SDUPTHER

## 2019-05-03 RX ORDER — SIMVASTATIN 20 MG
20 TABLET ORAL EVERY EVENING
Qty: 90 TABLET | Refills: 1 | Status: SHIPPED | OUTPATIENT
Start: 2019-05-03 | End: 2019-11-07 | Stop reason: SDUPTHER

## 2019-05-03 NOTE — PROGRESS NOTES
Glory Alcantar is a 63 y.o. female who presents for Type 2 diabetes mellitus. Current symptoms/problems include hyperglycemia and are worsening. 1.  Type 2 diabetes, controlled, with neuropathy ([address] [E11.40]    Diagnosed with Type 2 diabetes mellitus in 2004.     Comorbid conditions: Neuropathy    Current diabetic medications include: Humalog    Intolerance to diabetes medications: Yes Metformin     Weight trend: stable  Prior visit with dietician: yes  Current diet: on average, 3 meals per day  Current exercise: frequent     Current monitoring regimen: home blood tests - 10 times daily  Has brought blood glucose log/meter:  Yes  Home blood sugar records: fasting range:  and postprandial range:    Any episodes of hypoglycemia? Yes  Hypoglycemia frequency and time(s):  2 times a week  Does patient have Glucagon emergency kit? No  Does patient have rapid acting carbohydrate? Yes  Does patient wear a medic alert bracelet or necklace? No    2. Mixed hyperlipidemia  Has muscle pain. Had problems with lipitor and crestor. 3. Hashimoto's thyroiditis  Has fatigue. 4. Goiter  No difficulty swallowing. 5. Vitamin D deficiency  Has fatigue. 6. Essential hypertension  No headaches. 7. Class 2 obesity with serious comorbidity and body mass index (BMI) of 35.0 to 35.9 in adult, unspecified obesity type  Eats healthy. Exercises. Tried low carb diet, eats healthy, unsuccessful with weight loss. 8. Hypothyroidism  Has fatigue    No results found for: LABA1C  No results found for: EAG      Normal thyroid ultrasound               Electronically Signed by: Doris Jules DO, 8/16/2017 11:15 AM   Result Narrative   US-THYROID / NECK    ORDER DATE: 8/16/2017 10:50 AM    Clinical indication: E04.9: Nontoxic goiter, unspecified Reason for Exam:  Nontoxic goiter, unspecified.             Comparison: 10/26/2015    Findings:  The right thyroid lobe measures 4.8 x 1.4 x 1.3 cm and demonstrates CHOICE PO) Take by mouth      triamcinolone (KENALOG) 0.1 % cream Apply topically 2 times daily Apply topically 2 times daily.  lisinopril-hydrochlorothiazide (PRINZIDE;ZESTORETIC) 20-25 MG per tablet Take 1 tablet by mouth daily       No current facility-administered medications for this visit.       Allergies   Allergen Reactions    Sulfa Antibiotics Hives    Hydrocodone-Acetaminophen Nausea And Vomiting     Family Status   Relation Name Status    Mother      Father     [facility] Sister  (Not Specified)    Brother  Alive    Brother  Alive    Brother      Suzan  Alive       Review of Systems  Constitutional: has fatigue, no fever, no recent weight gain, no recent weight loss, no changes in appetite  Eyes: no eye pain, no change in vision, no eye redness, no eye irritation, no double vision  Ears, nose, throat: no nasal congestion, no sore throat, no earache, no decrease in hearing, no hoarseness, no dry mouth, no sinus problems, no difficulty swallowing, no neck lumps, no dental problems, no mouth sores, no ringing in ears  Pulmonary: no shortness of breath, no wheezing, no dyspnea on exertion, no cough  Cardiovascular: no chest pain, no lower extremity edema, no orthopnea, no intermittent leg claudication, no palpitations  Gastrointestinal: no abdominal pain, no nausea, no vomiting, no diarrhea, no constipation, no dysphagia, no heartburn, no bloating  Genitourinary: no dysuria, no urinary incontinence, no urinary hesitancy, no urinary frequency, no feelings of urinary urgency, no nocturia  Musculoskeletal: has joint swelling, has joint stiffness, has joint pain, no muscle cramps, has muscle pain  Integument/Breast: no hair loss, no skin rashes, no skin lesions, no itching, no dry skin  Neurological: no numbness, no tingling, no weakness, no confusion, no headaches, no dizziness, no fainting, no tremors, no decrease in memory, no balance problems  Psychiatric: no anxiety, no depression, no insomnia  Hematologic/Lymphatic: no tendency for easy bleeding, no swollen lymph nodes, no tendency for easy bruising  Immunology: no seasonal allergies, no frequent infections, no frequent illnesses  Endocrine: has temperature intolerance, no hirsutism, has hot flashes    OBJECTIVE:  /74 (Site: Left Upper Arm, Position: Sitting, Cuff Size: Large Adult)   Pulse 100   Ht 5' 8\" (1.727 m)   Wt 232 lb 9.6 oz (105.5 kg)   SpO2 99%   BMI 35.37 kg/m²      Wt Readings from Last 3 Encounters:   05/03/19 232 lb 9.6 oz (105.5 kg)   02/01/19 229 lb 6.4 oz (104.1 kg)   10/31/18 230 lb (104.3 kg)         Constitutional: no acute distress, well appearing and well nourished  Psychiatric: oriented to person, place and time, judgement and insight and normal, recent and remote memory and intact and mood and affect are normal  Skin: skin and subcutaneous tissue is normal without mass, normal turgor  Head and Face: examination of head and face revealed no abnormalities  Eyes: no lid or conjunctival swelling, erythema or discharge, pupils are normal, equal, round, reactive to light  Ears/Nose: external inspection of ears and nose revealed no abnormalities, hearing is grossly normal  Oropharynx/Mouth/Face: lips, tongue and gums are normal with no lesions, the voice quality was normal  Neck: neck is supple and symmetric, with midline trachea and no masses, thyroid is normal  Lymphatics: normal cervical lymph nodes, normal supraclavicular nodes  Pulmonary: no increased work of breathing or signs of respiratory distress, lungs are clear to auscultation  Cardiovascular: normal heart rate and rhythm, normal S1 and S2, no murmurs and pedal pulses and 2+ bilaterally, No edema  Abdomen: abdomen is soft, non-tender with no masses  Musculoskeletal: normal gait and station and exam of the digits and nails are normal  Neurological: normal coordination and normal general cortical function    Visual inspection:  Deformity/amputation: has hammer toes, no amputation  Skin lesions/pre-ulcerative calluses: absent  Edema: right- negative, left- negative    Sensory exam:  Monofilament sensation: normal  (minimum of 5 random plantar locations tested, avoiding callused areas - > 1 area with absence of sensation is + for neuropathy)     Plus at least one of the following:  Pulses: normal   Proprioception: Intact  Vibration (128 Hz): Impaired    ASSESSMENT/PLAN:  1. Type 2 diabetes, controlled, with neuropathy (HCC)  DID8R 6.1  Start Trulicity  Test 10 times daily. - insulin lispro (HUMALOG) 100 UNIT/ML injection vial; In insulin pump, total daily dose 150 units    - Hemoglobin A1C; Future  - Comprehensive Metabolic Panel; Future    2. Mixed hyperlipidemia  LDL 80  - simvastatin  - ezetimibe (ZETIA) 10 MG tablet; Take 1 tablet by mouth daily    - Lipid Panel; Future    3. Hashimoto's thyroiditis  Follow    4. Goiter  Thyroid sono    5. Vitamin D deficiency  25 hydroxy vitamin D 44  3000 IU daily. Supplements. - Cholecalciferol (VITAMIN D) 3000 units CAPS capsule; Take by mouth  - Vitamin D 25 Hydroxy; Future    6. Essential hypertension    - lisinopril-hydrochlorothiazide (PRINZIDE;ZESTORETIC) 20-25 MG per tablet; Take 1 tablet by mouth daily    7. Class 2 obesity with serious comorbidity and body mass index (BMI) of 35.0 to 35.9 in adult, unspecified obesity type  Diet, exercise  Phentermine 7/30/2018-10/30/2018  Weight 229 lbs 2/1/2019  Weight 237 lbs 7/30/2018. Weight 231 lbs 8/29/2018  Weight 230 lbs 12.8 oz on 9/25/2018  Weight 230 lbs on 10/31/2018    8. Acquired hypothyroidism  TSH 0.9  Synthroid to 0.05 mg.  - SYNTHROID 50 MCG tablet; Take 1 tablet by mouth every morning (before breakfast)    - T4, Free; Future  - TSH without Reflex;  Future    Reviewed and/or ordered clinical lab results Yes  Reviewed and/or ordered radiology tests Yes  Reviewed and/or ordered other diagnostic tests No  Discussed test results with performing physician No  Independently reviewed image, tracing, or specimen Yes Insulin pump data, settings, tracing, total 9 pages. See scanned report. Continue same rates and ratios. Made a decision to obtain old records No  Reviewed old records Yes  Obtained history from other than patient No    Ghassan Trevizo was counseled regarding symptoms of diabetes diagnosis, weight management course and complications of disease if inadequately treated, side effects of medications, diagnosis, treatment options, and prognosis, risks, benefits, complications, and alternatives of treatment, labs, imaging and other studies and treatment targets and goals, Trulicity. She understands instructions and counseling. Total visit time 40 min, >50% was counseling time    Return in about 3 months (around 8/3/2019) for thyroid problems, diabetes.

## 2019-05-20 DIAGNOSIS — E11.40 TYPE 2 DIABETES, CONTROLLED, WITH NEUROPATHY (HCC): ICD-10-CM

## 2019-05-20 NOTE — TELEPHONE ENCOUNTER
Pt called to see if she could have an Rx for Trulicity 4.64 sent to the [facility]. Pt states that the samples worked well for her.      LOV: 19  NOV: 19    Medication: Trulicity 3.09  Si pen weekly  Route: As Directed  Quantity: 4 pen

## 2019-05-21 NOTE — TELEPHONE ENCOUNTER
If she tolerates lower Trulicity dose, I usually increase the dose after month to 1.5 mg.  Please make sure patient agrees with that and then I'll sent prescription. Thank you.

## 2019-05-21 NOTE — TELEPHONE ENCOUNTER
Spoke with patient and she stated she is ok with increasing the dose to 1.5 mg and would like the prescription sent to [address] [place] [address].  Patient stated that she has decreased her bolus rate since starting Trulicty and her number shave leveled out

## 2019-08-02 ENCOUNTER — OFFICE VISIT (OUTPATIENT)
Dept: ENDOCRINOLOGY | Age: 59
End: 2019-08-02
Payer: COMMERCIAL

## 2019-08-02 VITALS
HEIGHT: 68 IN | OXYGEN SATURATION: 100 % | BODY MASS INDEX: 35.31 KG/M2 | HEART RATE: 89 BPM | DIASTOLIC BLOOD PRESSURE: 82 MMHG | SYSTOLIC BLOOD PRESSURE: 132 MMHG | WEIGHT: 233 LBS

## 2019-08-02 DIAGNOSIS — I10 ESSENTIAL HYPERTENSION: ICD-10-CM

## 2019-08-02 DIAGNOSIS — E55.9 VITAMIN D DEFICIENCY: ICD-10-CM

## 2019-08-02 DIAGNOSIS — E06.3 HASHIMOTO'S THYROIDITIS: ICD-10-CM

## 2019-08-02 DIAGNOSIS — E03.9 ACQUIRED HYPOTHYROIDISM: Primary | ICD-10-CM

## 2019-08-02 DIAGNOSIS — E11.40 TYPE 2 DIABETES, CONTROLLED, WITH NEUROPATHY (HCC): ICD-10-CM

## 2019-08-02 DIAGNOSIS — E04.9 GOITER: ICD-10-CM

## 2019-08-02 DIAGNOSIS — E78.2 MIXED HYPERLIPIDEMIA: ICD-10-CM

## 2019-08-02 DIAGNOSIS — E66.01 CLASS 2 SEVERE OBESITY WITH SERIOUS COMORBIDITY AND BODY MASS INDEX (BMI) OF 35.0 TO 35.9 IN ADULT, UNSPECIFIED OBESITY TYPE (HCC): ICD-10-CM

## 2019-08-02 PROCEDURE — 99214 OFFICE O/P EST MOD 30 MIN: CPT | Performed by: INTERNAL MEDICINE

## 2019-08-02 RX ORDER — PERPHENAZINE 16 MG/1
TABLET, FILM COATED ORAL
Qty: 900 EACH | Refills: 5 | Status: SHIPPED | OUTPATIENT
Start: 2019-08-02 | End: 2020-02-06

## 2019-08-02 RX ORDER — LEVOTHYROXINE SODIUM 50 MCG
50 TABLET ORAL
Qty: 90 TABLET | Refills: 1 | Status: SHIPPED | OUTPATIENT
Start: 2019-08-02 | End: 2019-11-07

## 2019-08-02 NOTE — PROGRESS NOTES
by mouth daily      MULTIPLE VITAMIN PO Take 1 tablet by mouth daily      hydroxychloroquine (PLAQUENIL) 200 MG tablet Take 200 mg by mouth 2 times daily      Inulin (FIBER CHOICE PO) Take by mouth      triamcinolone (KENALOG) 0.1 % cream Apply topically 2 times daily Apply topically 2 times daily. No current facility-administered medications for this visit.       Allergies   Allergen Reactions    Sulfa Antibiotics Hives    Hydrocodone-Acetaminophen Nausea And Vomiting     Family Status   Relation Name Status    Mother      Father     [facility] Sister  (Not Specified)    Brother  Alive    Brother  Alive    Brother      Suzan  Alive       Review of Systems  Constitutional: has fatigue, no fever, no recent weight gain, no recent weight loss, no changes in appetite  Eyes: no eye pain, no change in vision, no eye redness, no eye irritation, no double vision  Ears, nose, throat: no nasal congestion, no sore throat, no earache, no decrease in hearing, no hoarseness, no dry mouth, no sinus problems, no difficulty swallowing, no neck lumps, no dental problems, no mouth sores, no ringing in ears  Pulmonary: no shortness of breath, no wheezing, no dyspnea on exertion, no cough  Cardiovascular: no chest pain, no lower extremity edema, no orthopnea, no intermittent leg claudication, no palpitations  Gastrointestinal: no abdominal pain, no nausea, no vomiting, no diarrhea, no constipation, no dysphagia, no heartburn, no bloating  Genitourinary: no dysuria, no urinary incontinence, no urinary hesitancy, no urinary frequency, no feelings of urinary urgency, no nocturia  Musculoskeletal: has joint swelling, has joint stiffness, has joint pain, no muscle cramps, has muscle pain  Integument/Breast: no hair loss, no skin rashes, no skin lesions, no itching, no dry skin  Neurological: no numbness, no tingling, no weakness, no confusion, no headaches, no dizziness, no fainting, no tremors, no decrease in cortical function    Visual inspection:  Deformity/amputation: has hammer toes, no amputation  Skin lesions/pre-ulcerative calluses: absent  Edema: right- negative, left- negative    Sensory exam:  Monofilament sensation: normal  (minimum of 5 random plantar locations tested, avoiding callused areas - > 1 area with absence of sensation is + for neuropathy)     Plus at least one of the following:  Pulses: normal   Proprioception: Intact  Vibration (128 Hz): Impaired    ASSESSMENT/PLAN:  1. Type 2 diabetes, controlled, with neuropathy (HCC)  BVQ0G 9.6-9.8  Trulicity  Test 10 times daily. - insulin lispro (HUMALOG) 100 UNIT/ML injection vial; In insulin pump, total daily dose 150 units    - Hemoglobin A1C; Future  - Comprehensive Metabolic Panel; Future    2. Mixed hyperlipidemia  LDL 80-69  - simvastatin  - ezetimibe (ZETIA) 10 MG tablet; Take 1 tablet by mouth daily    - Lipid Panel; Future    3. Hashimoto's thyroiditis  Follow    4. Goiter  Thyroid sono    5. Vitamin D deficiency  25 hydroxy vitamin D 44-48  3000 IU daily. Supplements. - Cholecalciferol (VITAMIN D) 3000 units CAPS capsule; Take by mouth  - Vitamin D 25 Hydroxy; Future    6. Essential hypertension    - lisinopril-hydrochlorothiazide (PRINZIDE;ZESTORETIC) 20-25 MG per tablet; Take 1 tablet by mouth daily    7. Class 2 obesity with serious comorbidity and body mass index (BMI) of 35.0 to 35.9 in adult, unspecified obesity type  Diet, exercise  Wt Readings from Last 3 Encounters:   08/02/19 233 lb (105.7 kg)   05/03/19 232 lb 9.6 oz (105.5 kg)   02/01/19 229 lb 6.4 oz (104.1 kg)     Phentermine 7/30/2018-10/30/2018  Weight 229 lbs 2/1/2019  Weight 237 lbs 7/30/2018. Weight 231 lbs 8/29/2018  Weight 230 lbs 12.8 oz on 9/25/2018  Weight 230 lbs on 10/31/2018    8. Acquired hypothyroidism  TSH 0.9-1.3  Synthroid to 0.05 mg.  - SYNTHROID 50 MCG tablet;  Take 1 tablet by mouth every morning (before breakfast)    - T4, Free; Future  - TSH without Reflex;

## 2019-11-06 PROBLEM — M79.7 FIBROMYALGIA: Status: ACTIVE | Noted: 2017-05-15

## 2019-11-06 PROBLEM — E78.5 DYSLIPIDEMIA: Status: ACTIVE | Noted: 2019-02-12

## 2019-11-07 ENCOUNTER — OFFICE VISIT (OUTPATIENT)
Dept: ENDOCRINOLOGY | Age: 59
End: 2019-11-07
Payer: COMMERCIAL

## 2019-11-07 VITALS
DIASTOLIC BLOOD PRESSURE: 80 MMHG | BODY MASS INDEX: 35.31 KG/M2 | WEIGHT: 233 LBS | OXYGEN SATURATION: 100 % | SYSTOLIC BLOOD PRESSURE: 134 MMHG | HEIGHT: 68 IN | HEART RATE: 92 BPM

## 2019-11-07 DIAGNOSIS — E66.01 CLASS 2 SEVERE OBESITY WITH SERIOUS COMORBIDITY AND BODY MASS INDEX (BMI) OF 35.0 TO 35.9 IN ADULT, UNSPECIFIED OBESITY TYPE (HCC): ICD-10-CM

## 2019-11-07 DIAGNOSIS — E06.3 HASHIMOTO'S THYROIDITIS: ICD-10-CM

## 2019-11-07 DIAGNOSIS — E04.9 GOITER: ICD-10-CM

## 2019-11-07 DIAGNOSIS — E11.40 TYPE 2 DIABETES, CONTROLLED, WITH NEUROPATHY (HCC): Primary | ICD-10-CM

## 2019-11-07 DIAGNOSIS — I10 ESSENTIAL HYPERTENSION: ICD-10-CM

## 2019-11-07 DIAGNOSIS — E78.2 MIXED HYPERLIPIDEMIA: ICD-10-CM

## 2019-11-07 DIAGNOSIS — E55.9 VITAMIN D DEFICIENCY: ICD-10-CM

## 2019-11-07 DIAGNOSIS — E03.9 ACQUIRED HYPOTHYROIDISM: ICD-10-CM

## 2019-11-07 PROCEDURE — 99214 OFFICE O/P EST MOD 30 MIN: CPT | Performed by: INTERNAL MEDICINE

## 2019-11-07 RX ORDER — SIMVASTATIN 20 MG
20 TABLET ORAL EVERY EVENING
Qty: 90 TABLET | Refills: 1 | Status: SHIPPED | OUTPATIENT
Start: 2019-11-07 | End: 2020-04-10 | Stop reason: SDUPTHER

## 2019-11-07 RX ORDER — LEVOTHYROXINE SODIUM 50 MCG
50 TABLET ORAL
Qty: 90 TABLET | Refills: 1 | Status: SHIPPED | OUTPATIENT
Start: 2019-11-07 | End: 2020-02-06

## 2019-11-07 RX ORDER — FLUTICASONE PROPIONATE 0.5 MG/ML
LOTION TOPICAL
Refills: 3 | COMMUNITY
Start: 2019-09-02

## 2019-11-27 ENCOUNTER — TELEPHONE (OUTPATIENT)
Dept: ENDOCRINOLOGY | Age: 59
End: 2019-11-27

## 2019-11-27 DIAGNOSIS — E11.40 TYPE 2 DIABETES, CONTROLLED, WITH NEUROPATHY (HCC): Primary | ICD-10-CM

## 2019-12-12 ENCOUNTER — TELEPHONE (OUTPATIENT)
Dept: ENDOCRINOLOGY | Age: 59
End: 2019-12-12

## 2020-02-06 ENCOUNTER — OFFICE VISIT (OUTPATIENT)
Dept: ENDOCRINOLOGY | Age: 60
End: 2020-02-06
Payer: COMMERCIAL

## 2020-02-06 VITALS
DIASTOLIC BLOOD PRESSURE: 82 MMHG | HEART RATE: 95 BPM | HEIGHT: 68 IN | OXYGEN SATURATION: 99 % | SYSTOLIC BLOOD PRESSURE: 136 MMHG | WEIGHT: 240 LBS | BODY MASS INDEX: 36.37 KG/M2

## 2020-02-06 PROCEDURE — 99214 OFFICE O/P EST MOD 30 MIN: CPT | Performed by: INTERNAL MEDICINE

## 2020-02-06 RX ORDER — DOXEPIN HYDROCHLORIDE 10 MG/1
CAPSULE ORAL DAILY
COMMUNITY
Start: 2020-02-01

## 2020-02-06 RX ORDER — LEVOTHYROXINE SODIUM 50 MCG
TABLET ORAL
Qty: 100 TABLET | Refills: 1 | Status: SHIPPED | OUTPATIENT
Start: 2020-02-06 | End: 2020-04-10 | Stop reason: SDUPTHER

## 2020-02-06 RX ORDER — FLUTICASONE PROPIONATE 0.05 %
CREAM (GRAM) TOPICAL
COMMUNITY
Start: 2020-01-09

## 2020-02-06 RX ORDER — OMEPRAZOLE 20 MG/1
CAPSULE, DELAYED RELEASE ORAL DAILY
COMMUNITY
Start: 2020-01-27

## 2020-02-06 RX ORDER — PERPHENAZINE 16 MG/1
TABLET, FILM COATED ORAL
Qty: 900 EACH | Refills: 5 | Status: SHIPPED | OUTPATIENT
Start: 2020-02-06 | End: 2020-06-23 | Stop reason: SDUPTHER

## 2020-02-06 RX ORDER — CLOBETASOL PROPIONATE 0.5 MG/G
CREAM TOPICAL PRN
COMMUNITY
Start: 2020-01-08

## 2020-02-06 NOTE — PROGRESS NOTES
10/26/2015    Findings: The right thyroid lobe measures 4.8 x 1.4 x 1.3 cm and demonstrates homogeneous echotexture.  No discrete nodules or cysts seen.        The left thyroid lobe measures 5.0 x 1.5 x 1.2 cm and demonstrates homogeneous echotexture.  No discrete nodules or cysts seen.        Thyroid isthmus measures 0.2 cm and demonstrates homogeneous echotexture.  No discrete nodules or cysts seen.        No cervical lymphadenopathy noted.          Past Medical History:   Diagnosis Date    Asthma     Congenital connective tissue disorder     Depression     Fibromyalgia     HLP (hyperkeratosis lenticularis perstans)     Hypertension     Muscle weakness     Neuropathy     Type 2 diabetes mellitus without complication (HCC)     Weight gain       Patient Active Problem List   Diagnosis    Type 2 diabetes, controlled, with neuropathy [address] [address]    Mixed hyperlipidemia    Hashimoto's thyroiditis    Goiter    Vitamin D deficiency    Essential hypertension    Class 2 obesity with body mass index (BMI) of 35.0 to 35.9 in adult    Acquired hypothyroidism    Acute hepatitis    Asthma    Atypical endometrial cells on Pap smear    Dyslipidemia    Fibromyalgia    Gastroesophageal reflux disease without esophagitis    Hypothyroidism due to Hashimoto's thyroiditis    Other chest pain    Postmenopausal bleeding    Type 2 diabetes mellitus with diabetic polyneuropathy (HCC)     Past Surgical History:   Procedure Laterality Date    COLONOSCOPY      DILATION AND CURETTAGE OF UTERUS      ERCP      URETER STENT PLACEMENT       Social History     Socioeconomic History    Marital status:      Spouse name: Not on file    Number of children: Not on file    Years of education: Not on file    Highest education level: Not on file   Occupational History    Not on file   Social Needs    Financial resource strain: Not on file    Food insecurity:     Worry: Not on file     Inability: Not on file   [facility] Transportation needs:     Medical: Not on file     Non-medical: Not on file   Tobacco Use    Smoking status: Former Smoker     Years: 20.00     Types: Cigarettes    Smokeless tobacco: Never Used   Substance and Sexual Activity    Alcohol use:  Yes     Alcohol/week: 2.0 standard drinks     Types: 2 Glasses of wine per week    Drug use: No    Sexual activity: Yes     Partners: Male   Lifestyle    Physical activity:     Days per week: Not on file     Minutes per session: Not on file    Stress: Not on file   Relationships    Social connections:     Talks on phone: Not on file     Gets together: Not on file     Attends Judaism service: Not on file     Active member of club or organization: Not on file     Attends meetings of clubs or organizations: Not on file     Relationship status: Not on file    Intimate partner violence:     Fear of current or ex partner: Not on file     Emotionally abused: Not on file     Physically abused: Not on file     Forced sexual activity: Not on file   Other Topics Concern    Not on file   Social History Narrative    Not on file     Family History   Problem Relation Age of Onset    Heart Disease Mother     Diabetes Mother     Other Father 66        Lymphoma    Diabetes Sister     Other Sister         HLP    High Blood Pressure Brother     No Known Problems Brother     Other Brother 52        Suicide    Asthma Daughter     Other Daughter         Chiari Malformation     Current Outpatient Medications   Medication Sig Dispense Refill    fluticasone (CUTIVATE) 0.05 % cream       clobetasol (TEMOVATE) 0.05 % cream       omeprazole (PRILOSEC) 20 MG delayed release capsule       doxepin (SINEQUAN) 10 MG capsule       SYNTHROID 50 MCG tablet 1 tablet 6 days a week, one and a half tablet 1 day a week 100 tablet 1    CONTOUR NEXT TEST strip Test 10 times daily 900 each 5    insulin aspart (NOVOLOG) 100 UNIT/ML injection vial In insulin pump, total daily dose 150 units 15 vial 1    fluticasone propionate (CUTIVATE) 0.05 % LOTN lotion APPLY TO THE ITCHY RASH ON THE LOWER LEGS AND THIGHS DAILY TILL CLEAR**[facility] HAS IN STOCK**  3    simvastatin (ZOCOR) 20 MG tablet Take 1 tablet by mouth every evening 90 tablet 1    ezetimibe (ZETIA) 10 MG tablet Take 1 tablet by mouth daily 90 tablet 1    Cholecalciferol (VITAMIN D) 2000 units CAPS capsule Take 1 tablet alternating with 2 tablets every other day 30 capsule 0    nortriptyline (PAMELOR) 10 MG capsule Take 10 mg by mouth nightly      gabapentin (NEURONTIN) 600 MG tablet Take 600 mg by mouth 4 times daily.  traMADol (ULTRAM) 50 MG tablet Take 50 mg by mouth daily.  lisinopril-hydrochlorothiazide (PRINZIDE;ZESTORETIC) 20-25 MG per tablet Take 1 tablet by mouth daily      MULTIPLE VITAMIN PO Take 1 tablet by mouth daily      hydroxychloroquine (PLAQUENIL) 200 MG tablet Take 200 mg by mouth 2 times daily      Inulin (FIBER CHOICE PO) Take by mouth      triamcinolone (KENALOG) 0.1 % cream Apply topically 2 times daily Apply topically 2 times daily. No current facility-administered medications for this visit.       Allergies   Allergen Reactions    Sulfa Antibiotics Hives    Hydrocodone-Acetaminophen Nausea And Vomiting     Family Status   Relation Name Status    Mother      Father     [facility] Sister  (Not Specified)    Brother  Alive    Brother  Alive    Brother      Suzan  Alive       Review of Systems  Constitutional: has fatigue, no fever, no recent weight gain, no recent weight loss, no changes in appetite  Eyes: no eye pain, no change in vision, no eye redness, no eye irritation, no double vision  Ears, nose, throat: no nasal congestion, no sore throat, no earache, no decrease in hearing, no hoarseness, no dry mouth, no sinus problems, no difficulty swallowing, no neck lumps, no dental problems, no mouth sores, no ringing in ears  Pulmonary: no shortness of breath, no wheezing, no dyspnea on exertion, no cough  Cardiovascular: no chest pain, no lower extremity edema, no orthopnea, no intermittent leg claudication, no palpitations  Gastrointestinal: no abdominal pain, no nausea, no vomiting, no diarrhea, no constipation, no dysphagia, no heartburn, no bloating  Genitourinary: no dysuria, no urinary incontinence, no urinary hesitancy, no urinary frequency, no feelings of urinary urgency, no nocturia  Musculoskeletal: has joint swelling, has joint stiffness, has joint pain, no muscle cramps, has muscle pain  Integument/Breast: no hair loss, no skin rashes, no skin lesions, no itching, no dry skin  Neurological: no numbness, no tingling, no weakness, no confusion, no headaches, no dizziness, no fainting, no tremors, no decrease in memory, no balance problems  Psychiatric: no anxiety, no depression, no insomnia  Hematologic/Lymphatic: no tendency for easy bleeding, no swollen lymph nodes, no tendency for easy bruising  Immunology: no seasonal allergies, no frequent infections, no frequent illnesses  Endocrine: has temperature intolerance, no hirsutism, has hot flashes    OBJECTIVE:  /82 (Site: Left Upper Arm, Position: Sitting, Cuff Size: Large Adult)   Pulse 95   Ht 5' 8\" (1.727 m)   Wt 240 lb (108.9 kg)   SpO2 99%   BMI 36.49 kg/m²      Wt Readings from Last 3 Encounters:   02/06/20 240 lb (108.9 kg)   11/07/19 233 lb (105.7 kg)   08/02/19 233 lb (105.7 kg)         Constitutional: no acute distress, well appearing and well nourished  Psychiatric: oriented to person, place and time, judgement and insight and normal, recent and remote memory and intact and mood and affect are normal  Skin: skin and subcutaneous tissue is normal without mass, normal turgor  Head and Face: examination of head and face revealed no abnormalities  Eyes: no lid or conjunctival swelling, erythema or discharge, pupils are normal, equal, round, reactive to light  Ears/Nose: external inspection of ears and nose 25 Hydroxy; Future    6. Essential hypertension    - lisinopril-hydrochlorothiazide (PRINZIDE;ZESTORETIC) 20-25 MG per tablet; Take 1 tablet by mouth daily    7. Class 2 obesity with serious comorbidity and body mass index (BMI) of 35.0 to 35.9 in adult, unspecified obesity type  Diet, exercise    Wt Readings from Last 3 Encounters:   02/06/20 240 lb (108.9 kg)   11/07/19 233 lb (105.7 kg)   08/02/19 233 lb (105.7 kg)     05/03/19 232 lb 9.6 oz (105.5 kg)     02/01/19 229 lb 6.4 oz (104.1 kg)     Phentermine 7/30/2018-10/30/2018  Weight 229 lbs 2/1/2019  Weight 237 lbs 7/30/2018. Weight 231 lbs 8/29/2018  Weight 230 lbs 12.8 oz on 9/25/2018  Weight 230 lbs on 10/31/2018    8. Acquired hypothyroidism  TSH 0.9-1.3-2.1-3.3  Worse  Synthroid to 0.05 mg 1 tablet 6 days a week, one a half tablet 1 day a week  - T4, Free; Future  - TSH without Reflex; Future    9. Sees Rheumatologist, has work up for Lupus and MCTD    Reviewed and/or ordered clinical lab results Yes  Reviewed and/or ordered radiology tests Yes  Reviewed and/or ordered other diagnostic tests No  Discussed test results with performing physician No  Independently reviewed image, tracing, or specimen Yes Insulin pump data, settings, tracing, total 6 pages. See scanned report. Continue same rates and ratios. Made a decision to obtain old records No  Reviewed old records Yes  Obtained history from other than patient No    Lesley Rao was counseled regarding symptoms of diabetes diagnosis, weight management course and complications of disease if inadequately treated, side effects of medications, diagnosis, treatment options, and prognosis, risks, benefits, complications, and alternatives of treatment, labs, imaging and other studies and treatment targets and goals, Trulicity. She understands instructions and counseling. Return in about 3 months (around 5/6/2020) for diabetes.

## 2020-04-10 RX ORDER — SIMVASTATIN 20 MG
20 TABLET ORAL EVERY EVENING
Qty: 90 TABLET | Refills: 0 | Status: SHIPPED | OUTPATIENT
Start: 2020-04-10 | End: 2021-06-22 | Stop reason: SDUPTHER

## 2020-04-10 RX ORDER — LEVOTHYROXINE SODIUM 50 MCG
TABLET ORAL
Qty: 100 TABLET | Refills: 0 | Status: SHIPPED | OUTPATIENT
Start: 2020-04-10 | End: 2020-08-31

## 2020-04-10 NOTE — TELEPHONE ENCOUNTER
PT states her  is quite ill and they need to travel to the [facility] he is in cancer treatment and she needs refills of her Synthroid and Cholesterol pill sent to [id number]

## 2020-06-23 ENCOUNTER — OFFICE VISIT (OUTPATIENT)
Dept: ENDOCRINOLOGY | Age: 60
End: 2020-06-23
Payer: COMMERCIAL

## 2020-06-23 VITALS
DIASTOLIC BLOOD PRESSURE: 74 MMHG | HEART RATE: 89 BPM | WEIGHT: 244.6 LBS | HEIGHT: 68 IN | OXYGEN SATURATION: 99 % | SYSTOLIC BLOOD PRESSURE: 134 MMHG | TEMPERATURE: 97.5 F | BODY MASS INDEX: 37.07 KG/M2

## 2020-06-23 PROCEDURE — 99214 OFFICE O/P EST MOD 30 MIN: CPT | Performed by: INTERNAL MEDICINE

## 2020-06-23 RX ORDER — PERPHENAZINE 16 MG/1
TABLET, FILM COATED ORAL
Qty: 900 EACH | Refills: 5 | Status: SHIPPED | OUTPATIENT
Start: 2020-06-23 | End: 2020-06-24 | Stop reason: SDUPTHER

## 2020-06-23 RX ORDER — EZETIMIBE 10 MG/1
10 TABLET ORAL DAILY
Qty: 90 TABLET | Refills: 1 | Status: SHIPPED | OUTPATIENT
Start: 2020-06-23 | End: 2020-09-24 | Stop reason: SDUPTHER

## 2020-06-23 NOTE — PROGRESS NOTES
discrete nodules or cysts seen.        The left thyroid lobe measures 5.0 x 1.5 x 1.2 cm and demonstrates homogeneous echotexture.  No discrete nodules or cysts seen.        Thyroid isthmus measures 0.2 cm and demonstrates homogeneous echotexture.  No discrete nodules or cysts seen.        No cervical lymphadenopathy noted.          Past Medical History:   Diagnosis Date    Asthma     Congenital connective tissue disorder     Depression     Fibromyalgia     HLP (hyperkeratosis lenticularis perstans)     Hypertension     Muscle weakness     Neuropathy     Type 2 diabetes mellitus without complication (HCC)     Weight gain       Patient Active Problem List   Diagnosis    Type 2 diabetes, controlled, with neuropathy ([facility] [address]    Mixed hyperlipidemia    Hashimoto's thyroiditis    Goiter    Vitamin D deficiency    Essential hypertension    Class 2 obesity with body mass index (BMI) of 36.0 to 36.9 in adult    Acquired hypothyroidism    Acute hepatitis    Asthma    Atypical endometrial cells on Pap smear    Dyslipidemia    Fibromyalgia    Gastroesophageal reflux disease without esophagitis    Hypothyroidism due to Hashimoto's thyroiditis    Other chest pain    Postmenopausal bleeding    Type 2 diabetes mellitus with diabetic polyneuropathy (HCC)     Past Surgical History:   Procedure Laterality Date    COLONOSCOPY      DILATION AND CURETTAGE OF UTERUS      ERCP      URETER STENT PLACEMENT       Social History     Socioeconomic History    Marital status:      Spouse name: Not on file    Number of children: Not on file    Years of education: Not on file    Highest education level: Not on file   Occupational History    Not on file   Social Needs    Financial resource strain: Not on file    Food insecurity     Worry: Not on file     Inability: Not on file    Transportation needs     Medical: Not on file     Non-medical: Not on file   Tobacco Use    Smoking status: Former Smoker Years: 20.00     Types: Cigarettes    Smokeless tobacco: Never Used   Substance and Sexual Activity    Alcohol use:  Yes     Alcohol/week: 2.0 standard drinks     Types: 2 Glasses of wine per week    Drug use: No    Sexual activity: Yes     Partners: Male   Lifestyle    Physical activity     Days per week: Not on file     Minutes per session: Not on file    Stress: Not on file   Relationships    Social connections     Talks on phone: Not on file     Gets together: Not on file     Attends [employer] service: Not on file     Active member of club or organization: Not on file     Attends meetings of clubs or organizations: Not on file     Relationship status: Not on file    Intimate partner violence     Fear of current or ex partner: Not on file     Emotionally abused: Not on file     Physically abused: Not on file     Forced sexual activity: Not on file   Other Topics Concern    Not on file   Social History Narrative    Not on file     Family History   Problem Relation Age of Onset    Heart Disease Mother     Diabetes Mother    Rita Morrell Other Father 66        Lymphoma    Diabetes Sister     Other Sister         HLP    High Blood Pressure Brother     No Known Problems Brother     Other Brother 52        Suicide    Asthma Daughter     Other Daughter         Chiari Malformation     Current Outpatient Medications   Medication Sig Dispense Refill    CONTOUR NEXT TEST strip Test 10 times daily 900 each 5    ezetimibe (ZETIA) 10 MG tablet Take 1 tablet by mouth daily 90 tablet 1    SYNTHROID 50 MCG tablet 1 tablet 6 days a week, one and a half tablet 1 day a week 100 tablet 0    simvastatin (ZOCOR) 20 MG tablet Take 1 tablet by mouth every evening 90 tablet 0    fluticasone (CUTIVATE) 0.05 % cream       clobetasol (TEMOVATE) 0.05 % cream       omeprazole (PRILOSEC) 20 MG delayed release capsule       doxepin (SINEQUAN) 10 MG capsule       insulin aspart (NOVOLOG) 100 UNIT/ML injection vial In insulin pump, palpitations  Gastrointestinal: no abdominal pain, no nausea, no vomiting, no diarrhea, no constipation, no dysphagia, no heartburn, no bloating  Genitourinary: no dysuria, no urinary incontinence, no urinary hesitancy, no urinary frequency, no feelings of urinary urgency, no nocturia  Musculoskeletal: has joint swelling, has joint stiffness, has joint pain, no muscle cramps, has muscle pain  Integument/Breast: no hair loss, no skin rashes, no skin lesions, no itching, no dry skin  Neurological: no numbness, no tingling, no weakness, no confusion, no headaches, no dizziness, no fainting, no tremors, no decrease in memory, no balance problems  Psychiatric: no anxiety, no depression, no insomnia  Hematologic/Lymphatic: no tendency for easy bleeding, no swollen lymph nodes, no tendency for easy bruising  Immunology: no seasonal allergies, no frequent infections, no frequent illnesses  Endocrine: has temperature intolerance, no hirsutism, has hot flashes    OBJECTIVE:  /74 (Site: Left Upper Arm, Position: Sitting, Cuff Size: Large Adult)   Pulse 89   Temp 97.5 °F (36.4 °C) (Infrared)   Ht 5' 8\" (1.727 m)   Wt 244 lb 9.6 oz (110.9 kg)   SpO2 99%   BMI 37.19 kg/m²      Wt Readings from Last 3 Encounters:   06/23/20 244 lb 9.6 oz (110.9 kg)   02/06/20 240 lb (108.9 kg)   11/07/19 233 lb (105.7 kg)         Constitutional: no acute distress, well appearing and well nourished  Psychiatric: oriented to person, place and time, judgement and insight and normal, recent and remote memory and intact and mood and affect are normal  Skin: skin and subcutaneous tissue is normal without mass, normal turgor  Head and Face: examination of head and face revealed no abnormalities  Eyes: no lid or conjunctival swelling, erythema or discharge, pupils are normal, equal, round, reactive to light  Ears/Nose: external inspection of ears and nose revealed no abnormalities, hearing is grossly normal  Oropharynx/Mouth/Face: lips, tongue

## 2020-06-24 RX ORDER — PERPHENAZINE 16 MG/1
TABLET, FILM COATED ORAL
Qty: 900 EACH | Refills: 5 | Status: SHIPPED | OUTPATIENT
Start: 2020-06-24 | End: 2021-06-22 | Stop reason: SDUPTHER

## 2020-08-31 ENCOUNTER — TELEPHONE (OUTPATIENT)
Dept: ENDOCRINOLOGY | Age: 60
End: 2020-08-31

## 2020-08-31 RX ORDER — LEVOTHYROXINE SODIUM 50 MCG
TABLET ORAL
Qty: 100 TABLET | Refills: 0 | Status: SHIPPED | OUTPATIENT
Start: 2020-08-31 | End: 2020-09-24 | Stop reason: SDUPTHER

## 2020-09-23 ENCOUNTER — TELEPHONE (OUTPATIENT)
Dept: ENDOCRINOLOGY | Age: 60
End: 2020-09-23

## 2020-09-23 NOTE — TELEPHONE ENCOUNTER
PT lvm asking for a letter to give to her [employer] to excuse her from service due to they Do not want anyone there who is At Risk. She is At Risk with DM, Sika and Asthma and her  has cancer ans she cannot put him At Risk. Her appt is tomorrow and she would like the letter then.  Thank you

## 2020-09-24 ENCOUNTER — OFFICE VISIT (OUTPATIENT)
Dept: ENDOCRINOLOGY | Age: 60
End: 2020-09-24
Payer: COMMERCIAL

## 2020-09-24 VITALS
RESPIRATION RATE: 14 BRPM | HEIGHT: 68 IN | OXYGEN SATURATION: 99 % | DIASTOLIC BLOOD PRESSURE: 72 MMHG | SYSTOLIC BLOOD PRESSURE: 128 MMHG | WEIGHT: 239 LBS | HEART RATE: 95 BPM | BODY MASS INDEX: 36.22 KG/M2 | TEMPERATURE: 97.3 F

## 2020-09-24 PROCEDURE — 99214 OFFICE O/P EST MOD 30 MIN: CPT | Performed by: INTERNAL MEDICINE

## 2020-09-24 RX ORDER — LEVOTHYROXINE SODIUM 50 MCG
TABLET ORAL
Qty: 100 TABLET | Refills: 1 | Status: SHIPPED
Start: 2020-09-24 | End: 2021-03-22 | Stop reason: DRUGHIGH

## 2020-09-24 RX ORDER — EZETIMIBE 10 MG/1
10 TABLET ORAL DAILY
Qty: 90 TABLET | Refills: 1 | Status: SHIPPED | OUTPATIENT
Start: 2020-09-24 | End: 2021-06-22 | Stop reason: SDUPTHER

## 2020-09-24 NOTE — PROGRESS NOTES
Cathy Vigil is a 62 y.o. female who presents for Type 2 diabetes mellitus. Current symptoms/problems include hyperglycemia and are unchanged. 1.  Type 2 diabetes, controlled, with neuropathy ([address] [E11.40]    Diagnosed with Type 2 diabetes mellitus in 2004.     Comorbid conditions: Neuropathy    Current diabetic medications include: Humalog    Intolerance to diabetes medications: Yes Metformin    Patient is having a lot of stress, her  is ill. Despite that, she manages her diabetes very well. Weight trend: stable  Prior visit with dietician: yes  Current diet: on average, 3 meals per day  Current exercise: frequent     Current monitoring regimen: home blood tests - 10 times daily  Has brought blood glucose log/meter:  Yes  Home blood sugar records: fasting range:  and postprandial range:   Any episodes of hypoglycemia? Yes  Hypoglycemia frequency and time(s):  2 times a week  Does patient have Glucagon emergency kit? No  Does patient have rapid acting carbohydrate? Yes  Does patient wear a medic alert bracelet or necklace? No    2. Mixed hyperlipidemia  Has muscle pain. Had problems with lipitor and crestor. 3. Hashimoto's thyroiditis  Has fatigue. 4. Goiter  No difficulty swallowing. 5. Vitamin D deficiency  Has fatigue. 6. Essential hypertension  No headaches. 7. Obesity  Eats healthy. Exercises. Tried low carb diet, eats healthy, unsuccessful with weight loss. 8. Hypothyroidism  Has fatigue    9.  Sees Rheumatologist, has work up for MCTD    Lab Results   Component Value Date    LABA1C 6.6 (H) 09/15/2020     No results found for: EAG      Normal thyroid ultrasound               Electronically Signed by: Shantell Barajas DO, 8/16/2017 11:15 AM   Result Narrative   US-THYROID / NECK    ORDER DATE: 8/16/2017 10:50 AM    Clinical indication: E04.9: Nontoxic goiter, unspecified Reason for Exam:  Nontoxic goiter, unspecified.             Comparison: 10/26/2015    Findings: The right thyroid lobe measures 4.8 x 1.4 x 1.3 cm and demonstrates homogeneous echotexture.  No discrete nodules or cysts seen.        The left thyroid lobe measures 5.0 x 1.5 x 1.2 cm and demonstrates homogeneous echotexture.  No discrete nodules or cysts seen.        Thyroid isthmus measures 0.2 cm and demonstrates homogeneous echotexture.  No discrete nodules or cysts seen.        No cervical lymphadenopathy noted.          Past Medical History:   Diagnosis Date    Asthma     Congenital connective tissue disorder     Depression     Fibromyalgia     HLP (hyperkeratosis lenticularis perstans)     Hypertension     Muscle weakness     Neuropathy     Type 2 diabetes mellitus without complication (HCC)     Weight gain       Patient Active Problem List   Diagnosis    Type 2 diabetes, controlled, with neuropathy ([facility] [address]    Mixed hyperlipidemia    Hashimoto's thyroiditis    Goiter    Vitamin D deficiency    Essential hypertension    Class 2 obesity with body mass index (BMI) of 37.0 to 37.9 in adult    Acquired hypothyroidism    Acute hepatitis    Asthma    Atypical endometrial cells on Pap smear    Dyslipidemia    Fibromyalgia    Gastroesophageal reflux disease without esophagitis    Hypothyroidism due to Hashimoto's thyroiditis    Other chest pain    Postmenopausal bleeding    Type 2 diabetes mellitus with diabetic polyneuropathy (HCC)     Past Surgical History:   Procedure Laterality Date    COLONOSCOPY      DILATION AND CURETTAGE OF UTERUS      ERCP      URETER STENT PLACEMENT       Social History     Socioeconomic History    Marital status:      Spouse name: Not on file    Number of children: Not on file    Years of education: Not on file    Highest education level: Not on file   Occupational History    Not on file   Social Needs    Financial resource strain: Not on file    Food insecurity     Worry: Not on file     Inability: Not on file   [employer] needs     Medical: Not on file     Non-medical: Not on file   Tobacco Use    Smoking status: Former Smoker     Packs/day: 1.00     Years: 20.00     Pack years: 20.00     Types: Cigarettes     Last attempt to quit: 9/24/2010     Years since quitting: 10.0    Smokeless tobacco: Never Used   Substance and Sexual Activity    Alcohol use:  Yes     Alcohol/week: 2.0 standard drinks     Types: 2 Glasses of wine per week    Drug use: No    Sexual activity: Yes     Partners: Male   Lifestyle    Physical activity     Days per week: Not on file     Minutes per session: Not on file    Stress: Not on file   Relationships    Social connections     Talks on phone: Not on file     Gets together: Not on file     Attends Buddhist service: Not on file     Active member of club or organization: Not on file     Attends meetings of clubs or organizations: Not on file     Relationship status: Not on file    Intimate partner violence     Fear of current or ex partner: Not on file     Emotionally abused: Not on file     Physically abused: Not on file     Forced sexual activity: Not on file   Other Topics Concern    Not on file   Social History Narrative    Not on file     Family History   Problem Relation Age of Onset    Heart Disease Mother     Diabetes Mother     Other Father 66        Lymphoma    Diabetes Sister     Other Sister         HLP    High Blood Pressure Brother     No Known Problems Brother     Other Brother 52        Suicide    Asthma Daughter     Other Daughter         Chiari Malformation     Current Outpatient Medications   Medication Sig Dispense Refill    ezetimibe (ZETIA) 10 MG tablet Take 1 tablet by mouth daily 90 tablet 1    SYNTHROID 50 MCG tablet 1 tablet 6 days a week, one and a half tablet 1 day a week 100 tablet 1    CONTOUR NEXT TEST strip Test 10 times daily 900 each 5    simvastatin (ZOCOR) 20 MG tablet Take 1 tablet by mouth every evening 90 tablet 0    fluticasone (CUTIVATE) 0.05 % cream       clobetasol (TEMOVATE) 0.05 % cream       omeprazole (PRILOSEC) 20 MG delayed release capsule       doxepin (SINEQUAN) 10 MG capsule       insulin aspart (NOVOLOG) 100 UNIT/ML injection vial In insulin pump, total daily dose 150 units 15 vial 1    fluticasone propionate (CUTIVATE) 0.05 % LOTN lotion APPLY TO THE ITCHY RASH ON THE LOWER LEGS AND THIGHS DAILY TILL CLEAR**[place] [id number] HAS IN STOCK**  3    Cholecalciferol (VITAMIN D) 2000 units CAPS capsule Take 1 tablet alternating with 2 tablets every other day 30 capsule 0    nortriptyline (PAMELOR) 10 MG capsule Take 10 mg by mouth nightly      gabapentin (NEURONTIN) 600 MG tablet Take 600 mg by mouth 4 times daily.  traMADol (ULTRAM) 50 MG tablet Take 50 mg by mouth daily.  lisinopril-hydrochlorothiazide (PRINZIDE;ZESTORETIC) 20-25 MG per tablet Take 1 tablet by mouth daily      MULTIPLE VITAMIN PO Take 1 tablet by mouth daily      hydroxychloroquine (PLAQUENIL) 200 MG tablet Take 200 mg by mouth 2 times daily      Inulin (FIBER CHOICE PO) Take by mouth      triamcinolone (KENALOG) 0.1 % cream Apply topically 2 times daily Apply topically 2 times daily. No current facility-administered medications for this visit.       Allergies   Allergen Reactions    Sulfa Antibiotics Hives    Hydrocodone-Acetaminophen Nausea And Vomiting     Family Status   Relation Name Status    Mother      Father     [facility] Sister  (Not Specified)    Brother  Alive    Brother  Alive    Brother      Suzan  Alive       Review of Systems  Constitutional: has fatigue, no fever, no recent weight gain, no recent weight loss, no changes in appetite  Eyes: no eye pain, no change in vision, no eye redness, no eye irritation, no double vision  Ears, nose, throat: no nasal congestion, no sore throat, no earache, no decrease in hearing, no hoarseness, no dry mouth, no sinus problems, no difficulty swallowing, no neck lumps, no equal, round, reactive to light  Ears/Nose: external inspection of ears and nose revealed no abnormalities, hearing is grossly normal  Oropharynx/Mouth/Face: lips, tongue and gums are normal with no lesions, the voice quality was normal  Neck: neck is supple and symmetric, with midline trachea and no masses, thyroid is normal  Lymphatics: normal cervical lymph nodes, normal supraclavicular nodes  Pulmonary: no increased work of breathing or signs of respiratory distress, lungs are clear to auscultation  Cardiovascular: normal heart rate and rhythm, normal S1 and S2, no murmurs and pedal pulses and 2+ bilaterally, No edema  Abdomen: abdomen is soft, non-tender with no masses  Musculoskeletal: normal gait and station and exam of the digits and nails are normal  Neurological: normal coordination and normal general cortical function      ASSESSMENT/PLAN:  1. Type 2 diabetes, controlled, with neuropathy (HCC)  Insulin pump tracing shows very appropriate diabetes control, no significant hyper or hypoglycemia. Recommend to continue current rates and ratios  HbA1c 5.0-3.5-1.7-3.2-6.3-2.7  Stopped Trulicity due to side effects, diarrhea  Test 10 times daily. - insulin lispro (HUMALOG) 100 UNIT/ML injection vial; In insulin pump, total daily dose 150 units    - Hemoglobin A1C; Future  - Comprehensive Metabolic Panel; Future    2. Mixed hyperlipidemia  LDL [id number]  - simvastatin  - ezetimibe (ZETIA) 10 MG tablet; Take 1 tablet by mouth daily    - Lipid Panel; Future    3. Hashimoto's thyroiditis  Follow  TSH 2.1-3.3-2.3    4. Goiter  Thyroid sono    5. Vitamin D deficiency  25 hydroxy vitamin D [id number]  3000 IU daily. Supplements. - Cholecalciferol (VITAMIN D) 3000 units CAPS capsule; Take by mouth  - Vitamin D 25 Hydroxy; Future    6. Essential hypertension  - lisinopril-hydrochlorothiazide (PRINZIDE;ZESTORETIC) 20-25 MG per tablet; Take 1 tablet by mouth daily    7.  Obesity  Diet, exercise    Wt Readings

## 2020-09-24 NOTE — LETTER
[facility]  [address] [address]  Phone: [phone]  Fax: [phone]    Galilea Reyes MD        September 24, 2020     Patient: Tamanna Ceballos   YOB: 1960   Date of Visit: 9/24/2020       To Whom It May Concern: It is my medical opinion that Tamanna Ceballos is unable to perform jury duty at this time. Patient is high risk for Covid 19 due to co-morbid conditions. If you have any questions or concerns, please don't hesitate to call.     Sincerely,        Galilea Reyes MD

## 2020-09-25 ENCOUNTER — PATIENT MESSAGE (OUTPATIENT)
Dept: ENDOCRINOLOGY | Age: 60
End: 2020-09-25

## 2020-09-28 NOTE — TELEPHONE ENCOUNTER
The letter was already written. I made more specific one if the previous is not good. Please clarify. I can not state compromised because of diabetes, and MCTD is not my expertise. Also, she may request that letter because her  is immunocompromised from oncologist.    9/28/2020    To Whom It May Concern:    Ms. Quinn Oviedo has diabetes with microvascular complications, hypertension, autoimmune thyroid condition and mixed connective tissue disease. Centers for Disease Control and Preventions states that people with chronic conditions, such as diabetes, are at increased risk for serious illness and death from [place]. Patients with diabetes are six times more likely to be hospitalized and twelve times likely to die. I request to excuse Ms. Quinn Oviedo from participating in [employer]. Let me know if you need any further information.     Edgardo Dodge MD

## 2020-09-28 NOTE — TELEPHONE ENCOUNTER
From: Opal Greene  To: Kamila Sam MD  Sent: 9/25/2020 10:56 AM EDT  Subject: Non-Urgent Medical Question    I need a letter stating my diabetes and mixed connective tissue disease prohibits me from participating in jury duty.  My system is considered compromised which makes me more susceptible to viruses

## 2020-09-29 NOTE — TELEPHONE ENCOUNTER
I spoke with patient. She stated that she already turned in the letter. Await if this will be sufficient. If not, then will fill additional paperwork and send another letter.

## 2020-12-17 ENCOUNTER — OFFICE VISIT (OUTPATIENT)
Dept: ENDOCRINOLOGY | Age: 60
End: 2020-12-17
Payer: COMMERCIAL

## 2020-12-17 VITALS
SYSTOLIC BLOOD PRESSURE: 138 MMHG | DIASTOLIC BLOOD PRESSURE: 74 MMHG | TEMPERATURE: 97.4 F | HEIGHT: 68 IN | HEART RATE: 96 BPM | WEIGHT: 233 LBS | BODY MASS INDEX: 35.31 KG/M2

## 2020-12-17 PROCEDURE — 99215 OFFICE O/P EST HI 40 MIN: CPT | Performed by: INTERNAL MEDICINE

## 2020-12-17 NOTE — PROGRESS NOTES
10/26/2015    Findings: The right thyroid lobe measures 4.8 x 1.4 x 1.3 cm and demonstrates homogeneous echotexture.  No discrete nodules or cysts seen.        The left thyroid lobe measures 5.0 x 1.5 x 1.2 cm and demonstrates homogeneous echotexture.  No discrete nodules or cysts seen.        Thyroid isthmus measures 0.2 cm and demonstrates homogeneous echotexture.  No discrete nodules or cysts seen.        No cervical lymphadenopathy noted.          Past Medical History:   Diagnosis Date    Asthma     Congenital connective tissue disorder     Depression     Fibromyalgia     HLP (hyperkeratosis lenticularis perstans)     Hypertension     Muscle weakness     Neuropathy     Type 2 diabetes mellitus without complication (HCC)     Weight gain       Patient Active Problem List   Diagnosis    Type 2 diabetes, controlled, with neuropathy ([facility] [address]    Mixed hyperlipidemia    Hashimoto's thyroiditis    Goiter    Vitamin D deficiency    Essential hypertension    Class 2 obesity with body mass index (BMI) of 37.0 to 37.9 in adult    Acquired hypothyroidism    Acute hepatitis    Asthma    Atypical endometrial cells on Pap smear    Dyslipidemia    Fibromyalgia    Gastroesophageal reflux disease without esophagitis    Hypothyroidism due to Hashimoto's thyroiditis    Other chest pain    Postmenopausal bleeding    Type 2 diabetes mellitus with diabetic polyneuropathy (HCC)     Past Surgical History:   Procedure Laterality Date    COLONOSCOPY      DILATION AND CURETTAGE OF UTERUS      ERCP      URETER STENT PLACEMENT       Social History     Socioeconomic History    Marital status:      Spouse name: Not on file    Number of children: Not on file    Years of education: Not on file    Highest education level: Not on file   Occupational History    Not on file   Social Needs    Financial resource strain: Not on file    Food insecurity     Worry: Not on file     Inability: Not on file   [facility] Transportation needs     Medical: Not on file     Non-medical: Not on file   Tobacco Use    Smoking status: Former Smoker     Packs/day: 1.00     Years: 20.00     Pack years: 20.00     Types: Cigarettes     Quit date: 9/24/2010     Years since quitting: 10.2    Smokeless tobacco: Never Used   Substance and Sexual Activity    Alcohol use:  Yes     Alcohol/week: 2.0 standard drinks     Types: 2 Glasses of wine per week    Drug use: No    Sexual activity: Yes     Partners: Male   Lifestyle    Physical activity     Days per week: Not on file     Minutes per session: Not on file    Stress: Not on file   Relationships    Social connections     Talks on phone: Not on file     Gets together: Not on file     Attends [employer] service: Not on file     Active member of club or organization: Not on file     Attends meetings of clubs or organizations: Not on file     Relationship status: Not on file    Intimate partner violence     Fear of current or ex partner: Not on file     Emotionally abused: Not on file     Physically abused: Not on file     Forced sexual activity: Not on file   Other Topics Concern    Not on file   Social History Narrative    Not on file     Family History   Problem Relation Age of Onset    Heart Disease Mother     Diabetes Mother     Other Father 66        Lymphoma    Diabetes Sister     Other Sister         HLP    High Blood Pressure Brother     No Known Problems Brother     Other Brother 52        Suicide    Asthma Daughter     Other Daughter         Chiari Malformation     Current Outpatient Medications   Medication Sig Dispense Refill    ezetimibe (ZETIA) 10 MG tablet Take 1 tablet by mouth daily 90 tablet 1    SYNTHROID 50 MCG tablet 1 tablet 6 days a week, one and a half tablet 1 day a week 100 tablet 1    CONTOUR NEXT TEST strip Test 10 times daily 900 each 5    simvastatin (ZOCOR) 20 MG tablet Take 1 tablet by mouth every evening 90 tablet 0    fluticasone (CUTIVATE) 0.05 % cream       clobetasol (TEMOVATE) 0.05 % cream       omeprazole (PRILOSEC) 20 MG delayed release capsule       doxepin (SINEQUAN) 10 MG capsule       insulin aspart (NOVOLOG) 100 UNIT/ML injection vial In insulin pump, total daily dose 150 units 15 vial 1    fluticasone propionate (CUTIVATE) 0.05 % LOTN lotion APPLY TO THE ITCHY RASH ON THE LOWER LEGS AND THIGHS DAILY TILL CLEAR**[facility] HAS IN STOCK**  3    Cholecalciferol (VITAMIN D) 2000 units CAPS capsule Take 1 tablet alternating with 2 tablets every other day 30 capsule 0    nortriptyline (PAMELOR) 10 MG capsule Take 10 mg by mouth nightly      gabapentin (NEURONTIN) 600 MG tablet Take 600 mg by mouth 4 times daily.  traMADol (ULTRAM) 50 MG tablet Take 50 mg by mouth daily.  MULTIPLE VITAMIN PO Take 1 tablet by mouth daily      hydroxychloroquine (PLAQUENIL) 200 MG tablet Take 200 mg by mouth 2 times daily      Inulin (FIBER CHOICE PO) Take by mouth      triamcinolone (KENALOG) 0.1 % cream Apply topically 2 times daily Apply topically 2 times daily.  lisinopril-hydrochlorothiazide (PRINZIDE;ZESTORETIC) 20-25 MG per tablet Take 1 tablet by mouth daily       No current facility-administered medications for this visit.       Allergies   Allergen Reactions    Sulfa Antibiotics Hives    Hydrocodone-Acetaminophen Nausea And Vomiting     Family Status   Relation Name Status    Mother      Father     [facility] Sister  (Not Specified)    Brother  Alive    Brother  Alive    Brother      Suzan  Alive       Review of Systems  Constitutional: has fatigue, no fever, no recent weight gain, no recent weight loss, no changes in appetite  Eyes: no eye pain, no change in vision, no eye redness, no eye irritation, no double vision  Ears, nose, throat: no nasal congestion, no sore throat, no earache, no decrease in hearing, no hoarseness, no dry mouth, no sinus problems, no difficulty swallowing, no neck lumps, no dental problems, no mouth sores, no ringing in ears  Pulmonary: no shortness of breath, no wheezing, no dyspnea on exertion, no cough  Cardiovascular: no chest pain, no lower extremity edema, no orthopnea, no intermittent leg claudication, no palpitations  Gastrointestinal: no abdominal pain, no nausea, no vomiting, no diarrhea, no constipation, no dysphagia, no heartburn, no bloating  Genitourinary: no dysuria, no urinary incontinence, no urinary hesitancy, no urinary frequency, no feelings of urinary urgency, no nocturia  Musculoskeletal: has joint swelling, has joint stiffness, has joint pain, no muscle cramps, has muscle pain  Integument/Breast: no hair loss, no skin rashes, no skin lesions, no itching, no dry skin  Neurological: no numbness, no tingling, no weakness, no confusion, no headaches, no dizziness, no fainting, no tremors, no decrease in memory, no balance problems  Psychiatric: no anxiety, no depression, no insomnia  Hematologic/Lymphatic: no tendency for easy bleeding, no swollen lymph nodes, no tendency for easy bruising  Immunology: no seasonal allergies, no frequent infections, no frequent illnesses  Endocrine: has temperature intolerance, no hirsutism, has hot flashes    OBJECTIVE:  /74   Pulse 96   Temp 97.4 °F (36.3 °C)   Ht 5' 8\" (1.727 m)   Wt 233 lb (105.7 kg)   BMI 35.43 kg/m²      Wt Readings from Last 3 Encounters:   12/17/20 233 lb (105.7 kg)   09/24/20 239 lb (108.4 kg)   06/23/20 244 lb 9.6 oz (110.9 kg)         Constitutional: no acute distress, well appearing and well nourished  Psychiatric: oriented to person, place and time, judgement and insight and normal, recent and remote memory and intact and mood and affect are normal  Skin: skin and subcutaneous tissue is normal without mass, normal turgor  Head and Face: examination of head and face revealed no abnormalities  Eyes: no lid or conjunctival swelling, erythema or discharge, pupils are normal, equal, round, reactive to light  Ears/Nose: external inspection of ears and nose revealed no abnormalities, hearing is grossly normal  Oropharynx/Mouth/Face: lips, tongue and gums are normal with no lesions, the voice quality was normal  Neck: neck is supple and symmetric, with midline trachea and no masses, thyroid is normal  Lymphatics: normal cervical lymph nodes, normal supraclavicular nodes  Pulmonary: no increased work of breathing or signs of respiratory distress, lungs are clear to auscultation  Cardiovascular: normal heart rate and rhythm, normal S1 and S2, no murmurs and pedal pulses and 2+ bilaterally, No edema  Abdomen: abdomen is soft, non-tender with no masses  Musculoskeletal: normal gait and station and exam of the digits and nails are normal  Neurological: normal coordination and normal general cortical function      ASSESSMENT/PLAN:  1. Type 2 diabetes, controlled, with neuropathy (HCC)  Insulin pump tracing shows very appropriate diabetes control, no significant hyper or hypoglycemia. Recommend to continue current rates and ratios  HbA1c 9.1-4.4-8.2-2.2-1.0-1.5-3.4  Stopped Trulicity due to side effects, diarrhea  Test 10 times daily. - insulin lispro (HUMALOG) 100 UNIT/ML injection vial; In insulin pump, total daily dose 150 units    - Hemoglobin A1C; Future  - Comprehensive Metabolic Panel; Future    2. Mixed hyperlipidemia  LDL [id number]  - simvastatin  - ezetimibe (ZETIA) 10 MG tablet; Take 1 tablet by mouth daily    - Lipid Panel; Future    3. Hashimoto's thyroiditis  Follow  TSH 2.1-3.3-2.3    4. Goiter  Thyroid sono    5. Vitamin D deficiency  25 hydroxy vitamin D [id number]  3000 IU daily. Supplements. - Cholecalciferol (VITAMIN D) 3000 units CAPS capsule; Take by mouth  - Vitamin D 25 Hydroxy; Future    6. Essential hypertension  - lisinopril-hydrochlorothiazide (PRINZIDE;ZESTORETIC) 20-25 MG per tablet; Take 1 tablet by mouth daily    7.  Obesity  Diet, exercise    Wt Readings from Last 3 Encounters:   02/06/20 240 lb (108.9 kg)   11/07/19 233 lb (105.7 kg)   08/02/19 233 lb (105.7 kg)     05/03/19 232 lb 9.6 oz (105.5 kg)     02/01/19 229 lb 6.4 oz (104.1 kg)     Phentermine 7/30/2018-10/30/2018  Weight 229 lbs 2/1/2019  Weight 237 lbs 7/30/2018. Weight 231 lbs 8/29/2018  Weight 230 lbs 12.8 oz on 9/25/2018  Weight 230 lbs on 10/31/2018    8. Acquired hypothyroidism  TSH 0.9-1.3-2.1-3.3-2.3-2.3  Synthroid 0.05 mg 1 tablet 6 days a week, one a half tablet 1 day a week  - T4, Free; Future  - TSH without Reflex; Future    9. Sees Rheumatologist, has work up for Lupus and MCTD    Reviewed and/or ordered clinical lab results Yes  Reviewed and/or ordered radiology tests Yes  Reviewed and/or ordered other diagnostic tests No  Discussed test results with performing physician No  Independently reviewed image, tracing, or specimen Yes Insulin pump data, settings, tracing, total 6 pages. See scanned report. Continue same rates and ratios. Average blood glucose 130±41  Made a decision to obtain old records No  Reviewed old records Yes  Obtained history from other than patient No    Florencio Fitzgerald was counseled regarding symptoms of diabetes diagnosis, weight management course and complications of disease if inadequately treated, side effects of medications, diagnosis, treatment options, and prognosis, risks, benefits, complications, and alternatives of treatment, labs, imaging and other studies and treatment targets and goals, Trulicity. She understands instructions and counseling. Total visit time 40 minutes, more than 50% was counseling time  See assessment, plan and counseling note for details    Return in about 3 months (around 3/17/2021) for diabetes.

## 2021-01-20 ENCOUNTER — TELEPHONE (OUTPATIENT)
Dept: ENDOCRINOLOGY | Age: 61
End: 2021-01-20

## 2021-01-20 DIAGNOSIS — E04.9 GOITER: Primary | ICD-10-CM

## 2021-01-20 NOTE — TELEPHONE ENCOUNTER
PT lvm stating that she was seen by rheumatologist recently and was told her thyroid was enlarged.  She is asking for a call back

## 2021-01-20 NOTE — TELEPHONE ENCOUNTER
I spoke with patient. She has not noticed difference in her neck, she complains of some hoarseness, but no difficulty swallowing. Thyroid ultrasound was done in 2017 and showed slightly enlarged thyroid. On palpation during my exam it was unchanged. TSH normal.  We will repeat thyroid ultrasound. Please mail order to patient.

## 2021-03-18 ENCOUNTER — VIRTUAL VISIT (OUTPATIENT)
Dept: ENDOCRINOLOGY | Age: 61
End: 2021-03-18

## 2021-03-18 DIAGNOSIS — E03.9 ACQUIRED HYPOTHYROIDISM: ICD-10-CM

## 2021-03-18 DIAGNOSIS — E78.2 MIXED HYPERLIPIDEMIA: ICD-10-CM

## 2021-03-18 DIAGNOSIS — E11.40 TYPE 2 DIABETES, CONTROLLED, WITH NEUROPATHY (HCC): Primary | ICD-10-CM

## 2021-03-18 DIAGNOSIS — E06.3 HASHIMOTO'S THYROIDITIS: ICD-10-CM

## 2021-03-18 DIAGNOSIS — E55.9 VITAMIN D DEFICIENCY: ICD-10-CM

## 2021-03-18 DIAGNOSIS — E04.9 GOITER: ICD-10-CM

## 2021-03-18 DIAGNOSIS — I10 ESSENTIAL HYPERTENSION: ICD-10-CM

## 2021-03-18 NOTE — PROGRESS NOTES
Rick Patient is a 62 y.o. female who presents for Type 2 diabetes mellitus. 3/18/2021    TELEHEALTH EVALUATION -- Audio/Visual (During [facility] public health emergency)    Patient provided verbal consent to use the video visit. HPI:    Rick Patient (:  1960) has requested an audio/video evaluation for the following concern(s):      Current symptoms/problems include hyperglycemia and are unchanged. 1.  Type 2 diabetes, controlled, with neuropathy ([address] [E11.40]    Diagnosed with Type 2 diabetes mellitus in .     Comorbid conditions: Neuropathy    Current diabetic medications include: Humalog    Intolerance to diabetes medications: Yes Metformin    Patient is having a lot of stress, her  is ill. Despite that, she manages her diabetes very well. Weight trend: stable  Prior visit with dietician: yes  Current diet: on average, 3 meals per day  Current exercise: frequent     Current monitoring regimen: home blood tests - 10 times daily  Has brought blood glucose log/meter:  Yes  Home blood sugar records: fasting range:  and postprandial range:   Any episodes of hypoglycemia? Yes  Hypoglycemia frequency and time(s):  2 times a week  Does patient have Glucagon emergency kit? No  Does patient have rapid acting carbohydrate? Yes  Does patient wear a medic alert bracelet or necklace? No    2. Mixed hyperlipidemia  Has muscle pain. Had problems with lipitor and crestor. 3. Hashimoto's thyroiditis  Has fatigue. 4. Goiter  No difficulty swallowing. 5. Vitamin D deficiency  Has fatigue. 6. Essential hypertension  No headaches. 7. Obesity  Eats healthy. Exercises. Tried low carb diet, eats healthy, unsuccessful with weight loss. 8. Hypothyroidism  Has fatigue    9.  Sees Rheumatologist, has work up for MCTD    Lab Results   Component Value Date    LABA1C 6.3 (H) 2021     No results found for: EAG      Normal thyroid ultrasound               Electronically Signed by: Kleber Gaines DO, 8/16/2017 11:15 AM   Result Narrative   US-THYROID / NECK    ORDER DATE: 8/16/2017 10:50 AM    Clinical indication: E04.9: Nontoxic goiter, unspecified Reason for Exam:  Nontoxic goiter, unspecified.             Comparison: 10/26/2015    Findings: The right thyroid lobe measures 4.8 x 1.4 x 1.3 cm and demonstrates homogeneous echotexture.  No discrete nodules or cysts seen.        The left thyroid lobe measures 5.0 x 1.5 x 1.2 cm and demonstrates homogeneous echotexture.  No discrete nodules or cysts seen.        Thyroid isthmus measures 0.2 cm and demonstrates homogeneous echotexture.  No discrete nodules or cysts seen.        No cervical lymphadenopathy noted.          Past Medical History:   Diagnosis Date    Asthma     Congenital connective tissue disorder     Depression     Fibromyalgia     HLP (hyperkeratosis lenticularis perstans)     Hypertension     Muscle weakness     Neuropathy     Type 2 diabetes mellitus without complication (HCC)     Weight gain       Patient Active Problem List   Diagnosis    Type 2 diabetes, controlled, with neuropathy ([address]    Mixed hyperlipidemia    Hashimoto's thyroiditis    Goiter    Vitamin D deficiency    Essential hypertension    Class 2 obesity with body mass index (BMI) of 35.0 to 35.9 in adult    Acquired hypothyroidism    Acute hepatitis    Asthma    Atypical endometrial cells on Pap smear    Dyslipidemia    Fibromyalgia    Gastroesophageal reflux disease without esophagitis    Hypothyroidism due to Hashimoto's thyroiditis    Other chest pain    Postmenopausal bleeding    Type 2 diabetes mellitus with diabetic polyneuropathy (HCC)     Past Surgical History:   Procedure Laterality Date    COLONOSCOPY      DILATION AND CURETTAGE OF UTERUS      ERCP      URETER STENT PLACEMENT       Social History     Socioeconomic History    Marital status:      Spouse name: Not on file    Number of children: Not on file  Years of education: Not on file    Highest education level: Not on file   Occupational History    Not on file   Social Needs    Financial resource strain: Not on file    Food insecurity     Worry: Not on file     Inability: Not on file    Transportation needs     Medical: Not on file     Non-medical: Not on file   Tobacco Use    Smoking status: Former Smoker     Packs/day: 1.00     Years: 20.00     Pack years: 20.00     Types: Cigarettes     Quit date: 9/24/2010     Years since quitting: 10.4    Smokeless tobacco: Never Used   Substance and Sexual Activity    Alcohol use:  Yes     Alcohol/week: 2.0 standard drinks     Types: 2 Glasses of wine per week    Drug use: No    Sexual activity: Yes     Partners: Male   Lifestyle    Physical activity     Days per week: Not on file     Minutes per session: Not on file    Stress: Not on file   Relationships    Social connections     Talks on phone: Not on file     Gets together: Not on file     Attends [employer] service: Not on file     Active member of club or organization: Not on file     Attends meetings of clubs or organizations: Not on file     Relationship status: Not on file    Intimate partner violence     Fear of current or ex partner: Not on file     Emotionally abused: Not on file     Physically abused: Not on file     Forced sexual activity: Not on file   Other Topics Concern    Not on file   Social History Narrative    Not on file     Family History   Problem Relation Age of Onset    Heart Disease Mother     Diabetes Mother     Other Father 66        Lymphoma    Diabetes Sister     Other Sister         HLP    High Blood Pressure Brother     No Known Problems Brother     Other Brother 52        Suicide    Asthma Daughter     Other Daughter         Chiari Malformation     Current Outpatient Medications   Medication Sig Dispense Refill    ezetimibe (ZETIA) 10 MG tablet Take 1 tablet by mouth daily 90 tablet 1    SYNTHROID 50 MCG tablet 1 tablet 6 days a week, one and a half tablet 1 day a week 100 tablet 1    CONTOUR NEXT TEST strip Test 10 times daily 900 each 5    simvastatin (ZOCOR) 20 MG tablet Take 1 tablet by mouth every evening 90 tablet 0    fluticasone (CUTIVATE) 0.05 % cream       clobetasol (TEMOVATE) 0.05 % cream       omeprazole (PRILOSEC) 20 MG delayed release capsule       doxepin (SINEQUAN) 10 MG capsule       insulin aspart (NOVOLOG) 100 UNIT/ML injection vial In insulin pump, total daily dose 150 units 15 vial 1    Cholecalciferol (VITAMIN D) 2000 units CAPS capsule Take 1 tablet alternating with 2 tablets every other day 30 capsule 0    nortriptyline (PAMELOR) 10 MG capsule Take 10 mg by mouth nightly      gabapentin (NEURONTIN) 600 MG tablet Take 600 mg by mouth 4 times daily.  traMADol (ULTRAM) 50 MG tablet Take 50 mg by mouth daily.  MULTIPLE VITAMIN PO Take 1 tablet by mouth daily      hydroxychloroquine (PLAQUENIL) 200 MG tablet Take 200 mg by mouth 2 times daily      Inulin (FIBER CHOICE PO) Take by mouth      triamcinolone (KENALOG) 0.1 % cream Apply topically 2 times daily Apply topically 2 times daily.  fluticasone propionate (CUTIVATE) 0.05 % LOTN lotion APPLY TO THE ITCHY RASH ON THE LOWER LEGS AND THIGHS DAILY TILL CLEAR**[place] [id number] HAS IN STOCK**  3    lisinopril-hydrochlorothiazide (PRINZIDE;ZESTORETIC) 20-25 MG per tablet Take 1 tablet by mouth daily       No current facility-administered medications for this visit.       Allergies   Allergen Reactions    Sulfa Antibiotics Hives    Hydrocodone-Acetaminophen Nausea And Vomiting     Family Status   Relation Name Status    Mother      Father     [employer] Sister  (Not Specified)    Brother  Alive    Brother  Alive    Brother      Suzan  Alive       Review of Systems  Constitutional: has fatigue, no fever, no recent weight gain, no recent weight loss, no changes in appetite  Eyes: no eye pain, no change in vision, no eye redness, no eye irritation, no double vision  Ears, nose, throat: no nasal congestion, no sore throat, no earache, no decrease in hearing, no hoarseness, no dry mouth, no sinus problems, no difficulty swallowing, no neck lumps, no dental problems, no mouth sores, no ringing in ears  Pulmonary: no shortness of breath, no wheezing, no dyspnea on exertion, no cough  Cardiovascular: no chest pain, no lower extremity edema, no orthopnea, no intermittent leg claudication, no palpitations  Gastrointestinal: no abdominal pain, no nausea, no vomiting, no diarrhea, no constipation, no dysphagia, no heartburn, no bloating  Genitourinary: no dysuria, no urinary incontinence, no urinary hesitancy, no urinary frequency, no feelings of urinary urgency, no nocturia  Musculoskeletal: has joint swelling, has joint stiffness, has joint pain, no muscle cramps, has muscle pain  Integument/Breast: no hair loss, no skin rashes, no skin lesions, no itching, no dry skin  Neurological: no numbness, no tingling, no weakness, no confusion, no headaches, no dizziness, no fainting, no tremors, no decrease in memory, no balance problems  Psychiatric: no anxiety, no depression, no insomnia  Hematologic/Lymphatic: no tendency for easy bleeding, no swollen lymph nodes, no tendency for easy bruising  Immunology: no seasonal allergies, no frequent infections, no frequent illnesses  Endocrine: has temperature intolerance, no hirsutism, has hot flashes    OBJECTIVE:  There were no vitals taken for this visit.      Wt Readings from Last 3 Encounters:   12/17/20 233 lb (105.7 kg)   09/24/20 239 lb (108.4 kg)   06/23/20 244 lb 9.6 oz (110.9 kg)         OBJECTIVE:  Constitutional: no apparent distress, well developed and well nourished  Mental status: alert and awake, oriented to person, place and time, able to follow commands  Psychiatric: judgement and insight and normal, recent and remote memory are intact, mood and affect are normal  Skin: skin inspection appears normal, no significant exanthematous lesions or discoloration noted on facial skin  Head and Face: head and face inspection revealed no abnormalities, normocephalic, atraumatic  Eyes: no lid or conjunctival swelling, erythema or discharge, sclera appears normal  Ears/Nose: external inspection of ears and nose revealed no abnormalities, hearing is grossly normal  Oropharynx/Mouth/Face: lips are normal with no lesions, the voice quality was normal  Neck: neck is symmetric, no visualized mass  Pulmonary/chest: respiratory effort normal, no generalized signs of difficulty breathing or signs of respiratory distress  Musculoskeletal: normal station, normal range of motion of neck  Neurological: no facial asymmetry, normal general cortical function        ASSESSMENT/PLAN:  1. Type 2 diabetes, controlled, with neuropathy (HCC)  Insulin pump tracing shows very appropriate diabetes control, no significant hyper or hypoglycemia. Recommend to continue current rates and ratios  HbA1c 0.8-1.1-7.2-3.65.40.22.0  Stopped Trulicity due to side effects, diarrhea  Test 10 times daily. - insulin lispro (HUMALOG) 100 UNIT/ML injection vial; In insulin pump, total daily dose 150 units    - Hemoglobin A1C; Future  - Comprehensive Metabolic Panel; Future    2. Mixed hyperlipidemia  LDL 80-69-75-92645647  - simvastatin  - ezetimibe (ZETIA) 10 MG tablet; Take 1 tablet by mouth daily    - Lipid Panel; Future    3. Hashimoto's thyroiditis  Follow  TSH 2.1-3.32.3    4. Goiter  Thyroid sono    5. Vitamin D deficiency  25 hydroxy vitamin D 35-10-70-988140  3000 IU daily. Supplements. - Cholecalciferol (VITAMIN D) 3000 units CAPS capsule; Take by mouth  - Vitamin D 25 Hydroxy; Future    6. Essential hypertension  - lisinopril-hydrochlorothiazide (PRINZIDE;ZESTORETIC) 20-25 MG per tablet; Take 1 tablet by mouth daily    7.  Obesity  Diet, exercise    Wt Readings from Last 3 Encounters:   02/06/20 240 lb (108.9 kg) 11/07/19 233 lb (105.7 kg)   08/02/19 233 lb (105.7 kg)     05/03/19 232 lb 9.6 oz (105.5 kg)     02/01/19 229 lb 6.4 oz (104.1 kg)     Phentermine 7/30/2018-10/30/2018  Weight 229 lbs 2/1/2019  Weight 237 lbs 7/30/2018. Weight 231 lbs 8/29/2018  Weight 230 lbs 12.8 oz on 9/25/2018  Weight 230 lbs on 10/31/2018    8. Acquired hypothyroidism  TSH 0.9-1.3-2.1-3.3-2.32.3  Synthroid 0.05 mg 1 tablet 6 days a week, one a half tablet 1 day a week  - T4, Free; Future  - TSH without Reflex; Future    9. Sees Rheumatologist, has work up for Lupus and MCTD    Reviewed and/or ordered clinical lab results Yes  Reviewed and/or ordered radiology tests Yes  Reviewed and/or ordered other diagnostic tests No  Discussed test results with performing physician No  Independently reviewed image, tracing, or specimen Yes Insulin pump data, settings, tracing, total 6 pages. See scanned report. Continue same rates and ratios. Average blood glucose 130±41  Made a decision to obtain old records No  Reviewed old records Yes  Obtained history from other than patient No    Delmy Blanca was counseled regarding symptoms of diabetes diagnosis, weight management course and complications of disease if inadequately treated, side effects of medications, diagnosis, treatment options, and prognosis, risks, benefits, complications, and alternatives of treatment, labs, imaging and other studies and treatment targets and goals, Trulicity. She understands instructions and counseling. Delmy Blanca is a 62 y.o. female being evaluated by a Virtual Visit (video visit) encounter, including two-way audio and video communication, in lieu of an in-person visit due to coronavirus emergency, to address concerns as mentioned in history and assessment and plan. Patient identification was verified at the start of the visit. I conducted an interview, performed a limited exam by video and educated the patient on my assessment and plan.     Due to this being a TeleHealth encounter (During [facility] public health emergency), evaluation of the following organ systems was limited: Vitals/Constitutional/EENT/Resp/CV/GI//MS/Neuro/Skin/Heme-Lymph-Imm. Pursuant to the emergency declaration under the [facility], [address] [place] [address] and the [employer] Act, this Virtual Visit was conducted with patient's (and/or legal guardian's) consent, to reduce the patient's risk of exposure to COVID-19 and provide necessary medical care. The patient (and/or legal guardian) has also been advised to contact this office for worsening conditions or problems, and seek emergency medical treatment and/or call 911 if deemed necessary. Total time spent on this encounter via Telehealth (synchronous, real-time audio/visual connection): {Time Spent:76791345} min    I spent: {Time Spent:[id number]} face-to-face with the patient via Telehealth (synchronous, real-time audio/visual connection), and over 50% of that time was spent on counseling and care coordination. See assessment, plan and counseling note for counseling and care coordination details. Services were provided through a video synchronous discussion virtually to substitute for in-person clinic visit. Persons participating in the telehealth service: provider - Pilar Gonzáles MD and patient Brigida Armstrong. Provider was located at her office. Patient was located at home. --Pilar Gonzáles MD on 3/18/2021 at 11:20 AM    An electronic signature was used to authenticate this note. No follow-ups on file.

## 2021-03-22 ENCOUNTER — OFFICE VISIT (OUTPATIENT)
Dept: ENDOCRINOLOGY | Age: 61
End: 2021-03-22
Payer: COMMERCIAL

## 2021-03-22 VITALS
HEART RATE: 95 BPM | BODY MASS INDEX: 36.22 KG/M2 | SYSTOLIC BLOOD PRESSURE: 138 MMHG | WEIGHT: 239 LBS | RESPIRATION RATE: 14 BRPM | HEIGHT: 68 IN | DIASTOLIC BLOOD PRESSURE: 80 MMHG | OXYGEN SATURATION: 98 %

## 2021-03-22 DIAGNOSIS — E04.9 GOITER: ICD-10-CM

## 2021-03-22 DIAGNOSIS — E03.9 ACQUIRED HYPOTHYROIDISM: ICD-10-CM

## 2021-03-22 DIAGNOSIS — I10 ESSENTIAL HYPERTENSION: ICD-10-CM

## 2021-03-22 DIAGNOSIS — E11.40 TYPE 2 DIABETES, CONTROLLED, WITH NEUROPATHY (HCC): Primary | ICD-10-CM

## 2021-03-22 DIAGNOSIS — E55.9 VITAMIN D DEFICIENCY: ICD-10-CM

## 2021-03-22 DIAGNOSIS — E06.3 HASHIMOTO'S THYROIDITIS: ICD-10-CM

## 2021-03-22 DIAGNOSIS — E66.01 CLASS 2 SEVERE OBESITY WITH SERIOUS COMORBIDITY AND BODY MASS INDEX (BMI) OF 36.0 TO 36.9 IN ADULT, UNSPECIFIED OBESITY TYPE (HCC): ICD-10-CM

## 2021-03-22 DIAGNOSIS — E78.2 MIXED HYPERLIPIDEMIA: ICD-10-CM

## 2021-03-22 PROCEDURE — 99215 OFFICE O/P EST HI 40 MIN: CPT | Performed by: INTERNAL MEDICINE

## 2021-03-22 RX ORDER — LEVOTHYROXINE SODIUM 75 MCG
75 TABLET ORAL
Qty: 90 TABLET | Refills: 1 | Status: SHIPPED | OUTPATIENT
Start: 2021-03-22 | End: 2021-09-30 | Stop reason: SDUPTHER

## 2021-03-22 NOTE — PROGRESS NOTES
Irvin Lagos is a 62 y.o. female who presents for Type 2 diabetes mellitus. Current symptoms/problems include Leg weakness and are unchanged. 1.  Type 2 diabetes, controlled, with neuropathy ([address] [E11.40]    Diagnosed with Type 2 diabetes mellitus in 2004.     Comorbid conditions: Neuropathy  Medtronic MiniMed insulin pump 630G    Current diabetic medications include: Humalog    Intolerance to diabetes medications: Yes Metformin    Patient is having a lot of stress, her  is ill. Despite that, she manages her diabetes very well. She got severe muscle weakness in her legs  Difficult to walk, worse  Stopped working    Weight trend: stable  Prior visit with dietician: yes  Current diet: on average, 3 meals per day  Current exercise: frequent     Current monitoring regimen: home blood tests - 10 times daily  Has brought blood glucose log/meter:  Yes  Home blood sugar records: fasting range:  and postprandial range:   Any episodes of hypoglycemia? Yes  Hypoglycemia frequency and time(s):  2 times a week  Does patient have Glucagon emergency kit? No  Does patient have rapid acting carbohydrate? Yes  Does patient wear a medic alert bracelet or necklace? No    2. Mixed hyperlipidemia  Has muscle pain. Had problems with lipitor and crestor. 3. Hashimoto's thyroiditis  Has fatigue. 4. Goiter  No difficulty swallowing. 5. Vitamin D deficiency  Has fatigue. 6. Essential hypertension  No headaches. 7. Obesity  Eats healthy. Exercises. Tried low carb diet, eats healthy, unsuccessful with weight loss. 8. Hypothyroidism  Has fatigue    9.  Sees Rheumatologist, has work up for Dalton Lozano, 3/4/2021 10:37 AM       HISTORY:      Reason for Exam:  Nontoxic goiter, unspecified.   E04.9:    Nontoxic goiter, unspecified     .        COMPARISON: 8/16/2017       TECHNIQUE: Grayscale and limited color Doppler imaging.       FINDINGS:       RIGHT LOBE:  4.8 x 1.3 x 1.2 cm       LEFT LOBE:    4.8 x 1.8 x 1 cm       ISTHMUS:     0.2 cm       There is no cervical adenopathy.                       [IMPRESSION]       1.  Normal thyroid. No nodule.  No change       Electronically Signed by: Jovi Duff MD, 3/4/2021 3:03 PM         Lab Results   Component Value Date    LABA1C 6.3 (H) 03/09/2021     No results found for: EAG        Past Medical History:   Diagnosis Date    Asthma     Congenital connective tissue disorder     Depression     Fibromyalgia     HLP (hyperkeratosis lenticularis perstans)     Hypertension     Muscle weakness     Neuropathy     Type 2 diabetes mellitus without complication (HCC)     Weight gain       Patient Active Problem List   Diagnosis    Type 2 diabetes, controlled, with neuropathy ([facility]    Mixed hyperlipidemia    Hashimoto's thyroiditis    Goiter    Vitamin D deficiency    Essential hypertension    Class 2 obesity with body mass index (BMI) of 35.0 to 35.9 in adult    Acquired hypothyroidism    Acute hepatitis    Asthma    Atypical endometrial cells on Pap smear    Dyslipidemia    Fibromyalgia    Gastroesophageal reflux disease without esophagitis    Hypothyroidism due to Hashimoto's thyroiditis    Other chest pain    Postmenopausal bleeding    Type 2 diabetes mellitus with diabetic polyneuropathy (HCC)     Past Surgical History:   Procedure Laterality Date    COLONOSCOPY      DILATION AND CURETTAGE OF UTERUS      ERCP      URETER STENT PLACEMENT       Social History     Socioeconomic History    Marital status:      Spouse name: Not on file    Number of children: Not on file    Years of education: Not on file    Highest education level: Not on file   Occupational History    Not on file   Social Needs    Financial resource strain: Not on file    Food insecurity     Worry: Not on file     Inability: Not on file    Transportation needs     Medical: Not on file     Non-medical: Not on file   Tobacco Use    Smoking status: Former Smoker     Packs/day: 1.00     Years: 20.00     Pack years: 20.00     Types: Cigarettes     Quit date: 9/24/2010     Years since quitting: 10.4    Smokeless tobacco: Never Used   Substance and Sexual Activity    Alcohol use:  Yes     Alcohol/week: 2.0 standard drinks     Types: 2 Glasses of wine per week    Drug use: No    Sexual activity: Yes     Partners: Male   Lifestyle    Physical activity     Days per week: Not on file     Minutes per session: Not on file    Stress: Not on file   Relationships    Social connections     Talks on phone: Not on file     Gets together: Not on file     Attends Sikhism service: Not on file     Active member of club or organization: Not on file     Attends meetings of clubs or organizations: Not on file     Relationship status: Not on file    Intimate partner violence     Fear of current or ex partner: Not on file     Emotionally abused: Not on file     Physically abused: Not on file     Forced sexual activity: Not on file   Other Topics Concern    Not on file   Social History Narrative    Not on file     Family History   Problem Relation Age of Onset    Heart Disease Mother     Diabetes Mother    [facility] Other Father 66        Lymphoma    Diabetes Sister     Other Sister         HLP    High Blood Pressure Brother     No Known Problems Brother     Other Brother 52        Suicide    Asthma Daughter     Other Daughter         Chiari Malformation     Current Outpatient Medications   Medication Sig Dispense Refill    ezetimibe (ZETIA) 10 MG tablet Take 1 tablet by mouth daily 90 tablet 1    SYNTHROID 50 MCG tablet 1 tablet 6 days a week, one and a half tablet 1 day a week 100 tablet 1    CONTOUR NEXT TEST strip Test 10 times daily 900 each 5    simvastatin (ZOCOR) 20 MG tablet Take 1 tablet by mouth every evening (Patient taking differently: Take 50 mg by mouth every evening ) 90 tablet 0    fluticasone (CUTIVATE) 0.05 % cream       clobetasol (TEMOVATE) 0.05 % cream       omeprazole (PRILOSEC) 20 MG delayed release capsule       doxepin (SINEQUAN) 10 MG capsule       insulin aspart (NOVOLOG) 100 UNIT/ML injection vial In insulin pump, total daily dose 150 units 15 vial 1    Cholecalciferol (VITAMIN D) 2000 units CAPS capsule Take 1 tablet alternating with 2 tablets every other day 30 capsule 0    nortriptyline (PAMELOR) 10 MG capsule Take 40 mg by mouth nightly       gabapentin (NEURONTIN) 600 MG tablet Take 600 mg by mouth three times daily.  traMADol (ULTRAM) 50 MG tablet Take 50 mg by mouth daily.  MULTIPLE VITAMIN PO Take 1 tablet by mouth daily      hydroxychloroquine (PLAQUENIL) 200 MG tablet Take 200 mg by mouth 2 times daily      Inulin (FIBER CHOICE PO) Take by mouth      triamcinolone (KENALOG) 0.1 % cream Apply topically 2 times daily Apply topically 2 times daily.  fluticasone propionate (CUTIVATE) 0.05 % LOTN lotion APPLY TO THE ITCHY RASH ON THE LOWER LEGS AND THIGHS DAILY TILL CLEAR**[place] [id number] HAS IN STOCK**  3    lisinopril-hydrochlorothiazide (PRINZIDE;ZESTORETIC) 20-25 MG per tablet Take 1 tablet by mouth daily       No current facility-administered medications for this visit.       Allergies   Allergen Reactions    Sulfa Antibiotics Hives    Hydrocodone-Acetaminophen Nausea And Vomiting     Family Status   Relation Name Status    Mother      Father     Buren Neer Sister  (Not Specified)    Brother  Alive    Brother  Alive    Brother      Suzan  Alive       Review of Systems  Constitutional: has fatigue, no fever, no recent weight gain, no recent weight loss, no changes in appetite  Eyes: no eye pain, no change in vision, no eye redness, no eye irritation, no double vision  Ears, nose, throat: no nasal congestion, no sore throat, no earache, no decrease in hearing, no hoarseness, no dry mouth, no sinus problems, no difficulty swallowing, no neck lumps, no dental problems, no mouth sores, no ringing in ears  Pulmonary: no shortness of breath, no wheezing, no dyspnea on exertion, no cough  Cardiovascular: no chest pain, no lower extremity edema, no orthopnea, no intermittent leg claudication, no palpitations  Gastrointestinal: no abdominal pain, no nausea, no vomiting, no diarrhea, no constipation, no dysphagia, no heartburn, no bloating  Genitourinary: no dysuria, no urinary incontinence, no urinary hesitancy, no urinary frequency, no feelings of urinary urgency, no nocturia  Musculoskeletal: has joint swelling, has joint stiffness, has joint pain, no muscle cramps, has muscle pain  Integument/Breast: no hair loss, no skin rashes, no skin lesions, no itching, no dry skin  Neurological: no numbness, no tingling, no weakness, no confusion, no headaches, no dizziness, no fainting, no tremors, no decrease in memory, no balance problems  Psychiatric: no anxiety, no depression, no insomnia  Hematologic/Lymphatic: no tendency for easy bleeding, no swollen lymph nodes, no tendency for easy bruising  Immunology: no seasonal allergies, no frequent infections, no frequent illnesses  Endocrine: has temperature intolerance, no hirsutism, has hot flashes    OBJECTIVE:  /80   Pulse 95   Resp 14   Ht 5' 8\" (1.727 m)   Wt 239 lb (108.4 kg)   SpO2 98%   BMI 36.34 kg/m²      Wt Readings from Last 3 Encounters:   03/22/21 239 lb (108.4 kg)   12/17/20 233 lb (105.7 kg)   09/24/20 239 lb (108.4 kg)         Constitutional: no acute distress, well appearing and well nourished  Psychiatric: oriented to person, place and time, judgement and insight and normal, recent and remote memory and intact and mood and affect are normal  Skin: skin and subcutaneous tissue is normal without mass, normal turgor  Head and Face: examination of head and face revealed no abnormalities  Eyes: no lid or conjunctival swelling, erythema or discharge, pupils are normal, equal, round, reactive to light  Ears/Nose: external inspection of ears and nose revealed no abnormalities, hearing is grossly normal  Oropharynx/Mouth/Face: lips, tongue and gums are normal with no lesions, the voice quality was normal  Neck: neck is supple and symmetric, with midline trachea and no masses, thyroid is normal  Lymphatics: normal cervical lymph nodes, normal supraclavicular nodes  Pulmonary: no increased work of breathing or signs of respiratory distress, lungs are clear to auscultation  Cardiovascular: normal heart rate and rhythm, normal S1 and S2, no murmurs and pedal pulses and 2+ bilaterally, No edema  Abdomen: abdomen is soft, non-tender with no masses  Musculoskeletal: normal gait and station and exam of the digits and nails are normal  Neurological: normal coordination and normal general cortical function    Visual inspection:  Deformity/amputation: has hammer toes, no amputation  Skin lesions/pre-ulcerative calluses: absent  Edema: right- negative, left- negative     Sensory exam:  Monofilament sensation: normal  (minimum of 5 random plantar locations tested, avoiding callused areas - > 1 area with absence of sensation is + for neuropathy)     Plus at least one of the following:  Pulses: normal   Proprioception: Intact  Vibration (128 Hz): Impaired    ASSESSMENT/PLAN:  1. Type 2 diabetes, controlled, with neuropathy (HCC)  Insulin pump tracing shows very appropriate diabetes control, no significant hyper or hypoglycemia. Average blood glucose 129±19 recommend to continue current rates and ratios  HbA1c 0.4-9.7-5.3-3.1-9.4-7.4-8.4  Stopped Trulicity due to side effects, diarrhea  Test 10 times daily. - insulin lispro (HUMALOG) 100 UNIT/ML injection vial; In insulin pump, total daily dose 150 units    - Hemoglobin A1C; Future  - Comprehensive Metabolic Panel; Future    2. Mixed hyperlipidemia  LDL [id number]  - simvastatin  - ezetimibe (ZETIA) 10 MG tablet; Take 1 tablet by mouth daily    - Lipid Panel; Future    3.  Hashimoto's labs, imaging and other studies and treatment targets and goals  She understands instructions and counseling    Total time I spent for this encounter 40 minutes    Return in about 3 months (around 6/22/2021) for diabetes.

## 2021-05-24 ENCOUNTER — TELEPHONE (OUTPATIENT)
Dept: ENDOCRINOLOGY | Age: 61
End: 2021-05-24

## 2021-06-21 PROBLEM — M35.1 MCTD (MIXED CONNECTIVE TISSUE DISEASE) (HCC): Status: ACTIVE | Noted: 2021-06-21

## 2021-06-22 ENCOUNTER — OFFICE VISIT (OUTPATIENT)
Dept: ENDOCRINOLOGY | Age: 61
End: 2021-06-22
Payer: COMMERCIAL

## 2021-06-22 VITALS
BODY MASS INDEX: 35.61 KG/M2 | SYSTOLIC BLOOD PRESSURE: 126 MMHG | OXYGEN SATURATION: 100 % | WEIGHT: 235 LBS | DIASTOLIC BLOOD PRESSURE: 72 MMHG | HEART RATE: 87 BPM | HEIGHT: 68 IN

## 2021-06-22 DIAGNOSIS — R80.9 MICROALBUMINURIA: ICD-10-CM

## 2021-06-22 DIAGNOSIS — E06.3 HASHIMOTO'S THYROIDITIS: ICD-10-CM

## 2021-06-22 DIAGNOSIS — E04.9 GOITER: ICD-10-CM

## 2021-06-22 DIAGNOSIS — E11.40 TYPE 2 DIABETES, CONTROLLED, WITH NEUROPATHY (HCC): Primary | ICD-10-CM

## 2021-06-22 DIAGNOSIS — E66.01 CLASS 2 SEVERE OBESITY WITH SERIOUS COMORBIDITY AND BODY MASS INDEX (BMI) OF 35.0 TO 35.9 IN ADULT, UNSPECIFIED OBESITY TYPE (HCC): ICD-10-CM

## 2021-06-22 DIAGNOSIS — E55.9 VITAMIN D DEFICIENCY: ICD-10-CM

## 2021-06-22 DIAGNOSIS — I10 ESSENTIAL HYPERTENSION: ICD-10-CM

## 2021-06-22 DIAGNOSIS — M35.1 MCTD (MIXED CONNECTIVE TISSUE DISEASE) (HCC): ICD-10-CM

## 2021-06-22 DIAGNOSIS — E03.9 ACQUIRED HYPOTHYROIDISM: ICD-10-CM

## 2021-06-22 DIAGNOSIS — E78.2 MIXED HYPERLIPIDEMIA: ICD-10-CM

## 2021-06-22 PROCEDURE — 99215 OFFICE O/P EST HI 40 MIN: CPT | Performed by: INTERNAL MEDICINE

## 2021-06-22 RX ORDER — PERPHENAZINE 16 MG/1
TABLET, FILM COATED ORAL
Qty: 900 EACH | Refills: 5 | Status: SHIPPED | OUTPATIENT
Start: 2021-06-22 | End: 2021-06-30 | Stop reason: SDUPTHER

## 2021-06-22 RX ORDER — EZETIMIBE 10 MG/1
10 TABLET ORAL DAILY
Qty: 90 TABLET | Refills: 1 | Status: SHIPPED | OUTPATIENT
Start: 2021-06-22

## 2021-06-22 RX ORDER — SIMVASTATIN 20 MG
20 TABLET ORAL EVERY EVENING
Qty: 90 TABLET | Refills: 1 | Status: SHIPPED | OUTPATIENT
Start: 2021-06-22

## 2021-06-22 NOTE — PROGRESS NOTES
Lillian Pulido is a 62 y.o. female who presents for Type 2 diabetes mellitus. Current symptoms/problems include Leg weakness and are unchanged. 1.  Type 2 diabetes, controlled, with neuropathy ([address] [E11.40]    Diagnosed with Type 2 diabetes mellitus in 2004.     Comorbid conditions: Neuropathy  Medtronic MiniMed insulin pump 630G    Current diabetic medications include: Humalog    Intolerance to diabetes medications: Yes Metformin    Patient is having a lot of stress, her  is ill. Despite that, she manages her diabetes very well. She got severe muscle weakness in her legs  Difficult to walk, worse  Stopped working    Weight trend: stable  Prior visit with dietician: yes  Current diet: on average, 3 meals per day  Current exercise: frequent     Current monitoring regimen: home blood tests - 10 times daily  Has brought blood glucose log/meter:  Yes  Home blood sugar records: fasting range:  and postprandial range:   Any episodes of hypoglycemia? Yes  Hypoglycemia frequency and time(s):  2 times a week  Does patient have Glucagon emergency kit? No  Does patient have rapid acting carbohydrate? Yes  Does patient wear a medic alert bracelet or necklace? No    2. Mixed hyperlipidemia  Has muscle pain. Had problems with lipitor and crestor. 3. Hashimoto's thyroiditis  Has fatigue. 4. Goiter  No difficulty swallowing. 5. Vitamin D deficiency  Has fatigue. 6. Essential hypertension  No headaches. 7. Obesity  Eats healthy. Exercises. Tried low carb diet, eats healthy, unsuccessful with weight loss. 8. Hypothyroidism  Has fatigue    9. MCTD  Sees Rheumatologist  Has point pain, joint swelling, fatigue    10.   Microalbuminuria  No urination problems      Narrative   THYROID ULTRASOUND, 3/4/2021 10:37 AM       HISTORY:      Reason for Exam:  Nontoxic goiter, unspecified.   E04.9:    Nontoxic goiter, unspecified     .        COMPARISON: 8/16/2017       TECHNIQUE: Grayscale and limited color Doppler imaging.       FINDINGS:       RIGHT LOBE:  4.8 x 1.3 x 1.2 cm       LEFT LOBE:    4.8 x 1.8 x 1 cm       ISTHMUS:     0.2 cm       There is no cervical adenopathy.                       [IMPRESSION]       1.  Normal thyroid. No nodule.  No change       Electronically Signed by: Yusef Waggoner MD, 3/4/2021 3:03 PM         Lab Results   Component Value Date    LABA1C 6.6 (H) 06/14/2021     No results found for: EAG        Past Medical History:   Diagnosis Date    Asthma     Congenital connective tissue disorder     Depression     Fibromyalgia     HLP (hyperkeratosis lenticularis perstans)     Hypertension     Muscle weakness     Neuropathy     Type 2 diabetes mellitus without complication (HCC)       Patient Active Problem List   Diagnosis    Type 2 diabetes, controlled, with neuropathy ([address]    Mixed hyperlipidemia    Hashimoto's thyroiditis    Goiter    Vitamin D deficiency    Essential hypertension    Class 2 obesity with body mass index (BMI) of 35.0 to 35.9 in adult    Acquired hypothyroidism    Acute hepatitis    Asthma    Atypical endometrial cells on Pap smear    Dyslipidemia    Fibromyalgia    Gastroesophageal reflux disease without esophagitis    Other chest pain    Postmenopausal bleeding    Type 2 diabetes mellitus with diabetic polyneuropathy (HCC)    MCTD (mixed connective tissue disease) (HCC)    Microalbuminuria     Past Surgical History:   Procedure Laterality Date    COLONOSCOPY      DILATION AND CURETTAGE OF UTERUS      ERCP      URETER STENT PLACEMENT       Social History     Socioeconomic History    Marital status:      Spouse name: Not on file    Number of children: Not on file    Years of education: Not on file    Highest education level: Not on file   Occupational History    Not on file   Tobacco Use    Smoking status: Former Smoker     Packs/day: 1.00     Years: 20.00     Pack years: 20.00     Types: Cigarettes     Quit date: 9/24/2010     Years since quitting: 10.7    Smokeless tobacco: Never Used   Vaping Use    Vaping Use: Never used   Substance and Sexual Activity    Alcohol use: Yes     Alcohol/week: 2.0 standard drinks     Types: 2 Glasses of wine per week    Drug use: No    Sexual activity: Yes     Partners: Male   Other Topics Concern    Not on file   Social History Narrative    Not on file     Social Determinants of Health     Financial Resource Strain:     Difficulty of Paying Living Expenses:    Food Insecurity:     Worried About Running Out of Food in the Last Year:     [address] in the Last Year:    Transportation Needs:     Lack of Transportation (Medical):      Lack of Transportation (Non-Medical):    Physical Activity:     Days of Exercise per Week:     Minutes of Exercise per Session:    Stress:     Feeling of Stress :    Social Connections:     Frequency of Communication with Friends and Family:     Frequency of Social Gatherings with Friends and Family:     Attends Alevism Services:     Active Member of Clubs or Organizations:     Attends Club or Organization Meetings:     Marital Status:    Intimate Partner Violence:     Fear of Current or Ex-Partner:     Emotionally Abused:     Physically Abused:     Sexually Abused:      Family History   Problem Relation Age of Onset    Heart Disease Mother     Diabetes Mother     Other Father 66        Lymphoma    Diabetes Sister     Other Sister         HLP    High Blood Pressure Brother     No Known Problems Brother     Other Brother 52        Suicide    Asthma Daughter     Other Daughter         Chiari Malformation     Current Outpatient Medications   Medication Sig Dispense Refill    simvastatin (ZOCOR) 20 MG tablet Take 1 tablet by mouth every evening 90 tablet 1    ezetimibe (ZETIA) 10 MG tablet Take 1 tablet by mouth daily 90 tablet 1    CONTOUR NEXT TEST strip Test 10 times daily 900 each 5    insulin aspart (NOVOLOG) 100 UNIT/ML injection vial In insulin pump, total daily dose 150 units 15 vial 1    SYNTHROID 75 MCG tablet Take 1 tablet by mouth every morning (before breakfast) 90 tablet 1    fluticasone (CUTIVATE) 0.05 % cream       clobetasol (TEMOVATE) 0.05 % cream as needed       omeprazole (PRILOSEC) 20 MG delayed release capsule Daily       doxepin (SINEQUAN) 10 MG capsule daily       fluticasone propionate (CUTIVATE) 0.05 % LOTN lotion APPLY TO THE ITCHY RASH ON THE LOWER LEGS AND THIGHS DAILY TILL CLEAR**[facility] HAS IN STOCK**  3    Cholecalciferol (VITAMIN D) 2000 units CAPS capsule Take 1 tablet alternating with 2 tablets every other day 30 capsule 0    nortriptyline (PAMELOR) 10 MG capsule Take 40 mg by mouth nightly       gabapentin (NEURONTIN) 600 MG tablet Take 600 mg by mouth three times daily.  traMADol (ULTRAM) 50 MG tablet Take 50 mg by mouth as needed.  MULTIPLE VITAMIN PO Take 1 tablet by mouth daily      hydroxychloroquine (PLAQUENIL) 200 MG tablet Take 200 mg by mouth 2 times daily      Inulin (FIBER CHOICE PO) Take by mouth daily       triamcinolone (KENALOG) 0.1 % cream Apply topically 2 times daily Apply topically 2 times daily.  lisinopril-hydrochlorothiazide (PRINZIDE;ZESTORETIC) 20-25 MG per tablet Take 1 tablet by mouth daily       No current facility-administered medications for this visit.      Allergies   Allergen Reactions    Sulfa Antibiotics Hives    Hydrocodone-Acetaminophen Nausea And Vomiting     Family Status   Relation Name Status    Mother      Father     Veronika Davis Sister  (Not Specified)    Brother  Alive    Brother  Alive    Brother      Suzan  Alive       Review of Systems  Constitutional: has fatigue, no fever, no recent weight gain, no recent weight loss, no changes in appetite  Eyes: no eye pain, no change in vision, no eye redness, no eye irritation, no double vision  Ears, nose, throat: no nasal congestion, no sore throat, no earache, lid or conjunctival swelling, erythema or discharge, pupils are normal, equal, round, reactive to light  Ears/Nose: external inspection of ears and nose revealed no abnormalities, hearing is grossly normal  Oropharynx/Mouth/Face: lips, tongue and gums are normal with no lesions, the voice quality was normal  Neck: neck is supple and symmetric, with midline trachea and no masses, thyroid is normal  Lymphatics: normal cervical lymph nodes, normal supraclavicular nodes  Pulmonary: no increased work of breathing or signs of respiratory distress, lungs are clear to auscultation  Cardiovascular: normal heart rate and rhythm, normal S1 and S2, no murmurs and pedal pulses and 2+ bilaterally, No edema  Abdomen: abdomen is soft, non-tender with no masses  Musculoskeletal: normal gait and station and exam of the digits and nails are normal  Neurological: normal coordination and normal general cortical function      ASSESSMENT/PLAN:  1. Type 2 diabetes, controlled, with neuropathy (HCC)  Insulin pump tracing shows very appropriate diabetes control, no significant hyper or hypoglycemia. Average blood glucose 132±22, recommend to continue current rates and ratios  HbA1c 6.4-1.7-3.0-3.8-5.6-1.9-2.4-5.0  Stopped Trulicity due to side effects, diarrhea  Test 10 times daily. - insulin lispro (HUMALOG) 100 UNIT/ML injection vial; In insulin pump, total daily dose 150 units    - Hemoglobin A1C; Future  - Comprehensive Metabolic Panel; Future    2. Mixed hyperlipidemia  LDL [id number]  - simvastatin  - ezetimibe (ZETIA) 10 MG tablet; Take 1 tablet by mouth daily    - Lipid Panel; Future    3. Hashimoto's thyroiditis  Follow  TSH 2.1-3.3-2.3-1.4-2.7    4. Goiter  Thyroid sono  TSH 2.7    5. Vitamin D deficiency  25 hydroxy vitamin D [id number]  On 3000 IU daily. Supplements. - Cholecalciferol (VITAMIN D) 3000 units CAPS capsule;  Take by mouth, decrease to 2000 IU daily for summer  - Vitamin D 25 Hydroxy; Future    6. Essential hypertension  - lisinopril-hydrochlorothiazide (PRINZIDE;ZESTORETIC) 20-25 MG per tablet; Take 1 tablet by mouth daily    7. Obesity  Diet, exercise  History of weight:  02/06/20 240 lb (108.9 kg)   11/07/19 233 lb (105.7 kg)   08/02/19 233 lb (105.7 kg)     05/03/19 232 lb 9.6 oz (105.5 kg)     02/01/19 229 lb 6.4 oz (104.1 kg)     Phentermine 7/30/2018-10/30/2018  Weight 229 lbs 2/1/2019  Weight 237 lbs 7/30/2018. Weight 231 lbs 8/29/2018  Weight 230 lbs 12.8 oz on 9/25/2018  Weight 230 lbs on 10/31/2018    8. Acquired hypothyroidism  TSH 0.9-1.3-2.1-3.3-2.3-2.3-2.77  Continue Synthroid 0.075 mg qd  - T4, Free; Future  - TSH without Reflex; Future    9. MCTD  Continue Rheumatology follow up. 10.  Microalbuminuria  New problem  Repeat microalbumin creatinine ratio  On lisinopril      Reviewed and/or ordered clinical lab results Yes  Reviewed and/or ordered radiology tests Yes  Reviewed and/or ordered other diagnostic tests No  Discussed test results with performing physician No  Independently reviewed image, tracing, or specimen Yes Insulin pump data, settings, tracing, total 7 pages. See scanned report. Continue same rates and ratios.   Average blood glucose 132±22  Made a decision to obtain old records No  Reviewed old records Yes  Obtained history from other than patient No    Alfonso Villalba was counseled regarding symptoms of diabetes diagnosis, hypothyroidism course and complications of disease if inadequately treated, side effects of medications, diagnosis, treatment options, and prognosis, risks, benefits, complications, and alternatives of treatment, labs, imaging and other studies and treatment targets and goals, insulin pump records, reviewed tracing with patient, microalbuminuria diagnosis, work-up, monitoring kidney function  She understands instructions and counseling    Total time I spent for this encounter 40 minutes    Return in about 3 months (around 9/22/2021) for

## 2021-06-26 PROBLEM — R80.9 MICROALBUMINURIA: Status: ACTIVE | Noted: 2021-06-26

## 2021-06-30 DIAGNOSIS — E11.40 TYPE 2 DIABETES, CONTROLLED, WITH NEUROPATHY (HCC): ICD-10-CM

## 2021-06-30 RX ORDER — PERPHENAZINE 16 MG/1
TABLET, FILM COATED ORAL
Qty: 900 EACH | Refills: 5 | Status: SHIPPED | OUTPATIENT
Start: 2021-06-30 | End: 2022-01-20 | Stop reason: SDUPTHER

## 2021-06-30 NOTE — TELEPHONE ENCOUNTER
PT called in states that her Contour Test strips have to be a 90 day refill sent ONLY to [facility]. Local pharmacy was denied per Insurance. Please Re-Submit to [facility] for Test strips Only!

## 2021-09-30 ENCOUNTER — OFFICE VISIT (OUTPATIENT)
Dept: ENDOCRINOLOGY | Age: 61
End: 2021-09-30
Payer: COMMERCIAL

## 2021-09-30 VITALS
BODY MASS INDEX: 35.98 KG/M2 | OXYGEN SATURATION: 98 % | HEART RATE: 90 BPM | SYSTOLIC BLOOD PRESSURE: 128 MMHG | RESPIRATION RATE: 14 BRPM | TEMPERATURE: 98 F | HEIGHT: 68 IN | DIASTOLIC BLOOD PRESSURE: 68 MMHG | WEIGHT: 237.4 LBS

## 2021-09-30 DIAGNOSIS — R80.9 MICROALBUMINURIA: ICD-10-CM

## 2021-09-30 DIAGNOSIS — E03.9 ACQUIRED HYPOTHYROIDISM: ICD-10-CM

## 2021-09-30 DIAGNOSIS — M35.1 MCTD (MIXED CONNECTIVE TISSUE DISEASE) (HCC): ICD-10-CM

## 2021-09-30 DIAGNOSIS — E55.9 VITAMIN D DEFICIENCY: ICD-10-CM

## 2021-09-30 DIAGNOSIS — E11.40 TYPE 2 DIABETES, CONTROLLED, WITH NEUROPATHY (HCC): Primary | ICD-10-CM

## 2021-09-30 DIAGNOSIS — I10 ESSENTIAL HYPERTENSION: ICD-10-CM

## 2021-09-30 DIAGNOSIS — E66.01 CLASS 2 SEVERE OBESITY WITH SERIOUS COMORBIDITY AND BODY MASS INDEX (BMI) OF 36.0 TO 36.9 IN ADULT, UNSPECIFIED OBESITY TYPE (HCC): ICD-10-CM

## 2021-09-30 DIAGNOSIS — E06.3 HASHIMOTO'S THYROIDITIS: ICD-10-CM

## 2021-09-30 DIAGNOSIS — E78.2 MIXED HYPERLIPIDEMIA: ICD-10-CM

## 2021-09-30 DIAGNOSIS — E04.9 GOITER: ICD-10-CM

## 2021-09-30 PROCEDURE — 99215 OFFICE O/P EST HI 40 MIN: CPT | Performed by: INTERNAL MEDICINE

## 2021-09-30 RX ORDER — ESTRADIOL 0.1 MG/G
CREAM VAGINAL
COMMUNITY
Start: 2021-09-08

## 2021-09-30 RX ORDER — LEVOTHYROXINE SODIUM 75 MCG
75 TABLET ORAL
Qty: 90 TABLET | Refills: 1 | Status: SHIPPED | OUTPATIENT
Start: 2021-09-30 | End: 2021-12-10 | Stop reason: SDUPTHER

## 2021-09-30 NOTE — PROGRESS NOTES
Fatimah Ward is a 62 y.o. female who presents for Type 2 diabetes mellitus. Current symptoms/problems include Leg weakness and are unchanged. 1.  Type 2 diabetes, controlled, with neuropathy ([address] [E11.40]    Diagnosed with Type 2 diabetes mellitus in 2004.     Comorbid conditions: Neuropathy  Medtronic MiniMed insulin pump 630G    Current diabetic medications include: Humalog    Intolerance to diabetes medications: Yes Metformin    Patient is having a lot of stress, her  is ill. Despite that, she manages her diabetes very well. She got severe muscle weakness in her legs  Difficult to walk, worse  Stopped working    Weight trend: stable  Prior visit with dietician: yes  Current diet: on average, 3 meals per day  Current exercise: frequent     Current monitoring regimen: home blood tests - 10 times daily  Has brought blood glucose log/meter:  Yes  Home blood sugar records: fasting range:  and postprandial range:   Any episodes of hypoglycemia? Yes  Hypoglycemia frequency and time(s):  2 times a week  Does patient have Glucagon emergency kit? No  Does patient have rapid acting carbohydrate? Yes  Does patient wear a medic alert bracelet or necklace? No    2. Mixed hyperlipidemia  Has muscle pain. Had problems with lipitor and crestor. 3. Hashimoto's thyroiditis  Has fatigue. 4. Goiter  No difficulty swallowing. 5. Vitamin D deficiency  Has fatigue. 6. Essential hypertension  No headaches. 7. Obesity  Eats healthy. Exercises. Tried low carb diet, eats healthy, unsuccessful with weight loss. 8. Hypothyroidism  Has fatigue    9. MCTD  Sees Rheumatologist  Has point pain, joint swelling, fatigue    10.   Microalbuminuria  No urination problems      Narrative   THYROID ULTRASOUND, 3/4/2021 10:37 AM       HISTORY:      Reason for Exam:  Nontoxic goiter, unspecified.   E04.9:    Nontoxic goiter, unspecified     .        COMPARISON: 8/16/2017       TECHNIQUE: Grayscale and limited color Doppler imaging.       FINDINGS:       RIGHT LOBE:  4.8 x 1.3 x 1.2 cm       LEFT LOBE:    4.8 x 1.8 x 1 cm       ISTHMUS:     0.2 cm       There is no cervical adenopathy.                       [IMPRESSION]       1.  Normal thyroid. No nodule.  No change       Electronically Signed by: Jung Glover MD, 3/4/2021 3:03 PM         Lab Results   Component Value Date    LABA1C 5.9 09/23/2021     No results found for: EAG        Past Medical History:   Diagnosis Date    Asthma     Congenital connective tissue disorder     Depression     Fibromyalgia     HLP (hyperkeratosis lenticularis perstans)     Hypertension     Muscle weakness     Neuropathy     Type 2 diabetes mellitus without complication (HCC)       Patient Active Problem List   Diagnosis    Type 2 diabetes, controlled, with neuropathy ([address]    Mixed hyperlipidemia    Hashimoto's thyroiditis    Goiter    Vitamin D deficiency    Essential hypertension    Class 2 obesity with body mass index (BMI) of 35.0 to 35.9 in adult    Acquired hypothyroidism    Acute hepatitis    Asthma    Atypical endometrial cells on Pap smear    Dyslipidemia    Fibromyalgia    Gastroesophageal reflux disease without esophagitis    Other chest pain    Postmenopausal bleeding    Type 2 diabetes mellitus with diabetic polyneuropathy (HCC)    MCTD (mixed connective tissue disease) (HCC)    Microalbuminuria     Past Surgical History:   Procedure Laterality Date    COLONOSCOPY      DILATION AND CURETTAGE OF UTERUS      ERCP      URETER STENT PLACEMENT       Social History     Socioeconomic History    Marital status:      Spouse name: Not on file    Number of children: Not on file    Years of education: Not on file    Highest education level: Not on file   Occupational History    Not on file   Tobacco Use    Smoking status: Former Smoker     Packs/day: 1.00     Years: 20.00     Pack years: 20.00     Types: Cigarettes     Quit date: 2010     Years since quittin.0    Smokeless tobacco: Never Used    Tobacco comment: quit in    Vaping Use    Vaping Use: Never used   Substance and Sexual Activity    Alcohol use: Not Currently     Alcohol/week: 2.0 standard drinks     Types: 2 Glasses of wine per week    Drug use: No    Sexual activity: Yes     Partners: Male   Other Topics Concern    Not on file   Social History Narrative    Not on file     Social Determinants of Health     Financial Resource Strain:     Difficulty of Paying Living Expenses:    Food Insecurity:     Worried About Running Out of Food in the Last Year:     Ran Out of Food in the Last Year:    Transportation Needs:     Lack of Transportation (Medical):      Lack of Transportation (Non-Medical):    Physical Activity:     Days of Exercise per Week:     Minutes of Exercise per Session:    Stress:     Feeling of Stress :    Social Connections:     Frequency of Communication with Friends and Family:     Frequency of Social Gatherings with Friends and Family:     Attends [employer] Services:     Active Member of Clubs or Organizations:     Attends Club or Organization Meetings:     Marital Status:    Intimate Partner Violence:     Fear of Current or Ex-Partner:     Emotionally Abused:     Physically Abused:     Sexually Abused:      Family History   Problem Relation Age of Onset    Heart Disease Mother     Diabetes Mother     Other Father 66        Lymphoma    Diabetes Sister     Other Sister         HLP    High Blood Pressure Brother     No Known Problems Brother     Other Brother 52        Suicide    Asthma Daughter     Other Daughter         Chiari Malformation     Current Outpatient Medications   Medication Sig Dispense Refill    ESTRACE VAGINAL 0.1 MG/GM vaginal cream       mirabegron (MYRBETRIQ) 50 MG TB24 Take 50 mg by mouth daily      CONTOUR NEXT TEST strip Test 10 times daily 900 each 5    simvastatin (ZOCOR) 20 MG tablet Take 1 tablet by mouth every evening 90 tablet 1    ezetimibe (ZETIA) 10 MG tablet Take 1 tablet by mouth daily 90 tablet 1    insulin aspart (NOVOLOG) 100 UNIT/ML injection vial In insulin pump, total daily dose 150 units 15 vial 1    SYNTHROID 75 MCG tablet Take 1 tablet by mouth every morning (before breakfast) 90 tablet 1    fluticasone (CUTIVATE) 0.05 % cream       clobetasol (TEMOVATE) 0.05 % cream as needed       omeprazole (PRILOSEC) 20 MG delayed release capsule Daily       doxepin (SINEQUAN) 10 MG capsule daily       fluticasone propionate (CUTIVATE) 0.05 % LOTN lotion APPLY TO THE ITCHY RASH ON THE LOWER LEGS AND THIGHS DAILY TILL CLEAR**[place] [id number] HAS IN STOCK**  3    Cholecalciferol (VITAMIN D) 2000 units CAPS capsule Take 1 tablet alternating with 2 tablets every other day 30 capsule 0    nortriptyline (PAMELOR) 10 MG capsule Take 40 mg by mouth nightly       gabapentin (NEURONTIN) 600 MG tablet Take 600 mg by mouth three times daily.  traMADol (ULTRAM) 50 MG tablet Take 50 mg by mouth as needed.  lisinopril-hydrochlorothiazide (PRINZIDE;ZESTORETIC) 20-25 MG per tablet Take 1 tablet by mouth daily      MULTIPLE VITAMIN PO Take 1 tablet by mouth daily      hydroxychloroquine (PLAQUENIL) 200 MG tablet Take 200 mg by mouth 2 times daily      Inulin (FIBER CHOICE PO) Take by mouth daily       triamcinolone (KENALOG) 0.1 % cream Apply topically 2 times daily Apply topically 2 times daily. No current facility-administered medications for this visit.      Allergies   Allergen Reactions    Sulfa Antibiotics Hives    Hydrocodone-Acetaminophen Nausea And Vomiting     Family Status   Relation Name Status    Mother      Father     Yomi Vaughn Sister  (Not Specified)    Brother  Alive    Brother  Alive    Brother      Suzan  Alive       Review of Systems  Constitutional: has fatigue, no fever, no recent weight gain, no recent weight loss, no changes in appetite  Eyes: no eye pain, no change in vision, no eye redness, no eye irritation, no double vision  Ears, nose, throat: no nasal congestion, no sore throat, no earache, no decrease in hearing, no hoarseness, no dry mouth, no sinus problems, no difficulty swallowing, no neck lumps, no dental problems, no mouth sores, no ringing in ears  Pulmonary: no shortness of breath, no wheezing, no dyspnea on exertion, no cough  Cardiovascular: no chest pain, no lower extremity edema, no orthopnea, no intermittent leg claudication, no palpitations  Gastrointestinal: no abdominal pain, no nausea, no vomiting, no diarrhea, no constipation, no dysphagia, no heartburn, no bloating  Genitourinary: no dysuria, no urinary incontinence, no urinary hesitancy, no urinary frequency, no feelings of urinary urgency, no nocturia  Musculoskeletal: has joint swelling, has joint stiffness, has joint pain, no muscle cramps, has muscle pain  Integument/Breast: no hair loss, no skin rashes, no skin lesions, no itching, no dry skin  Neurological: no numbness, no tingling, no weakness, no confusion, no headaches, no dizziness, no fainting, no tremors, no decrease in memory, no balance problems  Psychiatric: no anxiety, no depression, no insomnia  Hematologic/Lymphatic: no tendency for easy bleeding, no swollen lymph nodes, no tendency for easy bruising  Immunology: no seasonal allergies, no frequent infections, no frequent illnesses  Endocrine: has temperature intolerance, no hirsutism, has hot flashes    OBJECTIVE:  /68   Pulse 90   Temp 98 °F (36.7 °C)   Resp 14   Ht 5' 8\" (1.727 m)   Wt 237 lb 6.4 oz (107.7 kg)   SpO2 98%   BMI 36.10 kg/m²      Wt Readings from Last 3 Encounters:   09/30/21 237 lb 6.4 oz (107.7 kg)   06/22/21 235 lb (106.6 kg)   03/22/21 239 lb (108.4 kg)         Constitutional: no acute distress, well appearing and well nourished  Psychiatric: oriented to person, place and time, judgement and insight and normal, recent and remote memory and intact and mood and affect are normal  Skin: skin and subcutaneous tissue is normal without mass, normal turgor  Head and Face: examination of head and face revealed no abnormalities  Eyes: no lid or conjunctival swelling, erythema or discharge, pupils are normal, equal, round, reactive to light  Ears/Nose: external inspection of ears and nose revealed no abnormalities, hearing is grossly normal  Oropharynx/Mouth/Face: lips, tongue and gums are normal with no lesions, the voice quality was normal  Neck: neck is supple and symmetric, with midline trachea and no masses, thyroid is normal  Lymphatics: normal cervical lymph nodes, normal supraclavicular nodes  Pulmonary: no increased work of breathing or signs of respiratory distress, lungs are clear to auscultation  Cardiovascular: normal heart rate and rhythm, normal S1 and S2, no murmurs and pedal pulses and 2+ bilaterally, No edema  Abdomen: abdomen is soft, non-tender with no masses  Musculoskeletal: normal gait and station and exam of the digits and nails are normal  Neurological: normal coordination and normal general cortical function      ASSESSMENT/PLAN:  1. Type 2 diabetes, controlled, with neuropathy (HCC)  Insulin pump tracing shows very appropriate diabetes control, no significant hyper or hypoglycemia. Average blood glucose 119±21, recommend to continue current rates and ratios  Basal amount 56%, Bolus amount 44%  HbA1c 2.5-5.6-3.5-2.6-3.8-5.8-2.3-5.3-5.8  Stopped Trulicity due to side effects, diarrhea  Test 10 times daily. - insulin lispro (HUMALOG) 100 UNIT/ML injection vial; In insulin pump, total daily dose 150 units    - Hemoglobin A1C; Future  - Comprehensive Metabolic Panel; Future    2. Mixed hyperlipidemia  LDL [id number]  - simvastatin  - ezetimibe (ZETIA) 10 MG tablet; Take 1 tablet by mouth daily    - Lipid Panel; Future    3. Hashimoto's thyroiditis  Follow  TSH 2.1-3.3-2.3-1.4-2.7-2.58    4.  Goiter  Thyroid sono  TSH 2.7-2.58    5. Vitamin D deficiency  25 hydroxy vitamin D [id number]  On 3000 IU daily. Supplements. - Cholecalciferol (VITAMIN D) 3000 units CAPS capsule; Take by mouth, decrease to 2000 IU daily for summer  - Vitamin D 25 Hydroxy; Future    6. Essential hypertension  - lisinopril-hydrochlorothiazide (PRINZIDE;ZESTORETIC) 20-25 MG per tablet; Take 1 tablet by mouth daily    7. Obesity  Diet, exercise  History of weight:  02/06/20 240 lb (108.9 kg)   11/07/19 233 lb (105.7 kg)   08/02/19 233 lb (105.7 kg)     05/03/19 232 lb 9.6 oz (105.5 kg)     02/01/19 229 lb 6.4 oz (104.1 kg)     Phentermine 7/30/2018-10/30/2018  Weight 229 lbs 2/1/2019  Weight 237 lbs 7/30/2018. Weight 231 lbs 8/29/2018  Weight 230 lbs 12.8 oz on 9/25/2018  Weight 230 lbs on 10/31/2018    8. Acquired hypothyroidism  TSH 0.9-1.3-2.1-3.3-2.3-2.3-2.77-2.58  Continue Synthroid 0.075 mg qd  - T4, Free; Future  - TSH without Reflex; Future    9. MCTD  Continue Rheumatology follow up. 10.  Microalbuminuria  Repeated microalbumin creatinine ratio normal  On lisinopril      Reviewed and/or ordered clinical lab results Yes  Reviewed and/or ordered radiology tests Yes  Reviewed and/or ordered other diagnostic tests No  Discussed test results with performing physician No  Independently reviewed image, tracing, or specimen Yes Insulin pump data, settings, tracing, total 7 pages. See scanned report. Continue same rates and ratios.   Average blood glucose 119±21  Made a decision to obtain old records No  Reviewed old records Yes  Obtained history from other than patient No    Bertrand Meigs was counseled regarding symptoms of diabetes diagnosis, hypothyroidism course and complications of disease if inadequately treated, side effects of medications, diagnosis, treatment options, and prognosis, risks, benefits, complications, and alternatives of treatment, labs, imaging and other studies and treatment targets and goals, insulin pump records, reviewed tracing with patient, microalbuminuria diagnosis, work-up, monitoring kidney function  She understands instructions and counseling    Total time I spent for this encounter 40 minutes    Return in about 3 months (around 12/30/2021) for diabetes, thyroid problems.

## 2021-12-10 DIAGNOSIS — E06.3 HASHIMOTO'S THYROIDITIS: ICD-10-CM

## 2021-12-10 DIAGNOSIS — E03.9 ACQUIRED HYPOTHYROIDISM: ICD-10-CM

## 2021-12-13 RX ORDER — LEVOTHYROXINE SODIUM 75 MCG
75 TABLET ORAL
Qty: 90 TABLET | Refills: 1 | Status: SHIPPED | OUTPATIENT
Start: 2021-12-13 | End: 2022-07-11 | Stop reason: SDUPTHER

## 2021-12-20 ENCOUNTER — TELEPHONE (OUTPATIENT)
Dept: ENDOCRINOLOGY | Age: 61
End: 2021-12-20

## 2021-12-20 ENCOUNTER — PATIENT MESSAGE (OUTPATIENT)
Dept: ENDOCRINOLOGY | Age: 61
End: 2021-12-20

## 2021-12-20 NOTE — TELEPHONE ENCOUNTER
PT left Urgent v/m stating she needs her script for DM supplies faxed to [phone] asap as she will be out of supplies Thursday.

## 2022-01-20 ENCOUNTER — OFFICE VISIT (OUTPATIENT)
Dept: ENDOCRINOLOGY | Age: 62
End: 2022-01-20
Payer: OTHER GOVERNMENT

## 2022-01-20 VITALS
RESPIRATION RATE: 14 BRPM | HEIGHT: 68 IN | DIASTOLIC BLOOD PRESSURE: 72 MMHG | WEIGHT: 242 LBS | OXYGEN SATURATION: 98 % | SYSTOLIC BLOOD PRESSURE: 126 MMHG | HEART RATE: 74 BPM | TEMPERATURE: 98 F | BODY MASS INDEX: 36.68 KG/M2

## 2022-01-20 DIAGNOSIS — I10 ESSENTIAL HYPERTENSION: ICD-10-CM

## 2022-01-20 DIAGNOSIS — E78.2 MIXED HYPERLIPIDEMIA: ICD-10-CM

## 2022-01-20 DIAGNOSIS — R80.9 MICROALBUMINURIA: ICD-10-CM

## 2022-01-20 DIAGNOSIS — E03.9 ACQUIRED HYPOTHYROIDISM: ICD-10-CM

## 2022-01-20 DIAGNOSIS — E06.3 HASHIMOTO'S THYROIDITIS: ICD-10-CM

## 2022-01-20 DIAGNOSIS — M35.1 MCTD (MIXED CONNECTIVE TISSUE DISEASE) (HCC): ICD-10-CM

## 2022-01-20 DIAGNOSIS — E55.9 VITAMIN D DEFICIENCY: ICD-10-CM

## 2022-01-20 DIAGNOSIS — E04.9 GOITER: ICD-10-CM

## 2022-01-20 DIAGNOSIS — E66.01 CLASS 2 SEVERE OBESITY WITH SERIOUS COMORBIDITY AND BODY MASS INDEX (BMI) OF 36.0 TO 36.9 IN ADULT, UNSPECIFIED OBESITY TYPE (HCC): ICD-10-CM

## 2022-01-20 DIAGNOSIS — E11.40 TYPE 2 DIABETES, CONTROLLED, WITH NEUROPATHY (HCC): Primary | ICD-10-CM

## 2022-01-20 PROCEDURE — 99215 OFFICE O/P EST HI 40 MIN: CPT | Performed by: INTERNAL MEDICINE

## 2022-01-20 RX ORDER — PERPHENAZINE 16 MG/1
TABLET, FILM COATED ORAL
Qty: 900 EACH | Refills: 5 | Status: SHIPPED | OUTPATIENT
Start: 2022-01-20

## 2022-04-19 LAB
ALBUMIN/GLOBULIN RATIO: 2.2 RATIO (ref 0.8–2.6)
ALBUMIN: 4.3 G/DL (ref 3.5–5.2)
ALP BLD-CCNC: 61 U/L (ref 23–144)
ALT SERPL-CCNC: 32 U/L (ref 0–60)
AST SERPL-CCNC: 27 U/L (ref 0–55)
BILIRUB SERPL-MCNC: 0.5 MG/DL (ref 0–1.2)
BUN BLDV-MCNC: 14 MG/DL (ref 3–29)
BUN/CREAT BLD: 16 (ref 7–25)
CALCIUM SERPL-MCNC: 9.5 MG/DL (ref 8.5–10.5)
CHLORIDE BLD-SCNC: 100 MEQ/L (ref 96–110)
CHOLESTEROL: 146 MG/DL
CO2: 27 MEQ/L (ref 19–32)
CREAT SERPL-MCNC: 0.9 MG/DL (ref 0.5–1.2)
CREATININE URINE: 127.4 MG/DL
GLOBULIN: 2 G/DL (ref 1.9–3.6)
GLOMERULAR FILTRATION RATE: 73 MLS/MIN/1.73M2
GLUCOSE BLD-MCNC: 124 MG/DL (ref 70–99)
HBA1C MFR BLD: 6.7 % (ref 4–6)
HDLC SERPL-MCNC: 74 MG/DL
LDL CHOLESTEROL CALCULATED: 54 MG/DL
MICROALBUMIN UR-MCNC: 6810 MCG/DL
MICROALBUMIN/CREAT UR-RTO: 53 MCG/MG CREAT.
POTASSIUM SERPL-SCNC: 4 MEQ/L (ref 3.4–5.3)
SODIUM BLD-SCNC: 137 MEQ/L (ref 135–148)
STATUS: ABNORMAL
T3 FREE: 2.4 PG/ML (ref 2.3–4.2)
T4 FREE: 1.38 NG/DL (ref 0.8–1.8)
TOTAL PROTEIN: 6.3 G/DL (ref 6–8.3)
TRIGL SERPL-MCNC: 92 MG/DL
TSH SERPL DL<=0.05 MIU/L-ACNC: 2.48 MCIU/ML (ref 0.4–4.5)
VITAMIN D 25-HYDROXY: 37 NG/ML (ref 30–100)
VLDLC SERPL CALC-MCNC: 18 MG/DL (ref 4–38)

## 2022-04-28 ENCOUNTER — OFFICE VISIT (OUTPATIENT)
Dept: ENDOCRINOLOGY | Age: 62
End: 2022-04-28
Payer: COMMERCIAL

## 2022-04-28 VITALS
HEIGHT: 68 IN | WEIGHT: 239 LBS | DIASTOLIC BLOOD PRESSURE: 82 MMHG | OXYGEN SATURATION: 99 % | SYSTOLIC BLOOD PRESSURE: 136 MMHG | HEART RATE: 78 BPM | TEMPERATURE: 98 F | BODY MASS INDEX: 36.22 KG/M2 | RESPIRATION RATE: 14 BRPM

## 2022-04-28 DIAGNOSIS — E06.3 HASHIMOTO'S THYROIDITIS: ICD-10-CM

## 2022-04-28 DIAGNOSIS — I10 ESSENTIAL HYPERTENSION: ICD-10-CM

## 2022-04-28 DIAGNOSIS — E78.2 MIXED HYPERLIPIDEMIA: ICD-10-CM

## 2022-04-28 DIAGNOSIS — E03.9 ACQUIRED HYPOTHYROIDISM: ICD-10-CM

## 2022-04-28 DIAGNOSIS — E55.9 VITAMIN D DEFICIENCY: ICD-10-CM

## 2022-04-28 DIAGNOSIS — E11.40 TYPE 2 DIABETES, CONTROLLED, WITH NEUROPATHY (HCC): Primary | ICD-10-CM

## 2022-04-28 DIAGNOSIS — E04.9 GOITER: ICD-10-CM

## 2022-04-28 DIAGNOSIS — R80.9 MICROALBUMINURIA: ICD-10-CM

## 2022-04-28 DIAGNOSIS — E66.01 CLASS 2 SEVERE OBESITY WITH SERIOUS COMORBIDITY AND BODY MASS INDEX (BMI) OF 36.0 TO 36.9 IN ADULT, UNSPECIFIED OBESITY TYPE (HCC): ICD-10-CM

## 2022-04-28 DIAGNOSIS — M35.1 MCTD (MIXED CONNECTIVE TISSUE DISEASE) (HCC): ICD-10-CM

## 2022-04-28 PROCEDURE — 3044F HG A1C LEVEL LT 7.0%: CPT | Performed by: INTERNAL MEDICINE

## 2022-04-28 PROCEDURE — 99215 OFFICE O/P EST HI 40 MIN: CPT | Performed by: INTERNAL MEDICINE

## 2022-04-28 RX ORDER — INSULIN LISPRO 100 [IU]/ML
INJECTION, SOLUTION INTRAVENOUS; SUBCUTANEOUS
Qty: 150 ML | Refills: 1 | Status: SHIPPED | OUTPATIENT
Start: 2022-04-28 | End: 2022-08-04 | Stop reason: SDUPTHER

## 2022-04-28 NOTE — PROGRESS NOTES
Vasu Mccabe is a 65 y.o. female who presents for Type 2 diabetes mellitus. Current symptoms/problems include leg weakness and are unchanged. 1.  Type 2 diabetes, controlled, with neuropathy ([address] [E11.40]    Diagnosed with Type 2 diabetes mellitus in 2004.     Comorbid conditions: Neuropathy  Medtronic MiniMed insulin pump 630G    Current diabetic medications include: Humalog    Intolerance to diabetes medications: Yes Metformin    Patient is having a lot of stress, her  is ill. Despite that, she manages her diabetes very well. She got severe muscle weakness in her legs  Difficult to walk, worse  Stopped working    Weight trend: stable  Prior visit with dietician: yes  Current diet: on average, 3 meals per day  Current exercise: frequent     Current monitoring regimen: home blood tests - 10 times daily  Has brought blood glucose log/meter:  Yes  Home blood sugar records: fasting range:  and postprandial range:   Any episodes of hypoglycemia? Yes  Hypoglycemia frequency and time(s):  2 times a week  Does patient have Glucagon emergency kit? No  Does patient have rapid acting carbohydrate? Yes  Does patient wear a medic alert bracelet or necklace? No    2. Mixed hyperlipidemia  Has muscle pain. Had problems with lipitor and crestor. 3. Hashimoto's thyroiditis  Has fatigue. 4. Goiter  No difficulty swallowing. 5. Vitamin D deficiency  Has fatigue. 6. Essential hypertension  No headaches. 7. Obesity  Eats healthy. Exercises. Tried low carb diet, eats healthy, unsuccessful with weight loss. 8. Hypothyroidism  Has fatigue    9. MCTD  Sees Rheumatologist  Has point pain, joint swelling, fatigue    10.   Microalbuminuria  No urination problems      Narrative   THYROID ULTRASOUND, 3/4/2021 10:37 AM       HISTORY:      Reason for Exam:  Nontoxic goiter, unspecified.   E04.9:    Nontoxic goiter, unspecified     .        COMPARISON: 8/16/2017       TECHNIQUE: Grayscale and limited color Doppler imaging.       FINDINGS:       RIGHT LOBE:  4.8 x 1.3 x 1.2 cm       LEFT LOBE:    4.8 x 1.8 x 1 cm       ISTHMUS:     0.2 cm       There is no cervical adenopathy.                       [IMPRESSION]       1.  Normal thyroid. No nodule.  No change       Electronically Signed by: Lesli Light MD, 3/4/2021 3:03 PM         Lab Results   Component Value Date    LABA1C 6.7 (H) 04/19/2022     No results found for: EAG        Past Medical History:   Diagnosis Date    Asthma     Congenital connective tissue disorder     Depression     Fibromyalgia     HLP (hyperkeratosis lenticularis perstans)     Hypertension     Muscle weakness     Neuropathy     Type 2 diabetes mellitus without complication (HCC)       Patient Active Problem List   Diagnosis    Type 2 diabetes, controlled, with neuropathy ([address]    Mixed hyperlipidemia    Hashimoto's thyroiditis    Goiter    Vitamin D deficiency    Essential hypertension    Class 2 obesity with body mass index (BMI) of 36.0 to 36.9 in adult    Acquired hypothyroidism    Acute hepatitis    Asthma    Atypical endometrial cells on Pap smear    Dyslipidemia    Fibromyalgia    Gastroesophageal reflux disease without esophagitis    Other chest pain    Postmenopausal bleeding    Type 2 diabetes mellitus with diabetic polyneuropathy (HCC)    MCTD (mixed connective tissue disease) (HCC)    Microalbuminuria     Past Surgical History:   Procedure Laterality Date    COLONOSCOPY      DILATION AND CURETTAGE OF UTERUS      ERCP      URETER STENT PLACEMENT       Social History     Socioeconomic History    Marital status:      Spouse name: Not on file    Number of children: Not on file    Years of education: Not on file    Highest education level: Not on file   Occupational History    Not on file   Tobacco Use    Smoking status: Former Smoker     Packs/day: 1.00     Years: 20.00     Pack years: 20.00     Types: Cigarettes     Quit date: 2010     Years since quittin.6    Smokeless tobacco: Never Used    Tobacco comment: quit in    Vaping Use    Vaping Use: Never used   Substance and Sexual Activity    Alcohol use: Not Currently     Alcohol/week: 2.0 standard drinks     Types: 2 Glasses of wine per week    Drug use: No    Sexual activity: Yes     Partners: Male   Other Topics Concern    Not on file   Social History Narrative    Not on file     Social Determinants of Health     Financial Resource Strain:     Difficulty of Paying Living Expenses: Not on file   Food Insecurity:     Worried About Running Out of Food in the Last Year: Not on file    Dedra of Food in the Last Year: Not on file   Transportation Needs:     Lack of Transportation (Medical): Not on file    Lack of Transportation (Non-Medical):  Not on file   Physical Activity:     Days of Exercise per Week: Not on file    Minutes of Exercise per Session: Not on file   Stress:     Feeling of Stress : Not on file   Social Connections:     Frequency of Communication with Friends and Family: Not on file    Frequency of Social Gatherings with Friends and Family: Not on file    Attends [employer] Services: Not on file    Active Member of Clubs or Organizations: Not on file    Attends Club or Organization Meetings: Not on file    Marital Status: Not on file   Intimate Partner Violence:     Fear of Current or Ex-Partner: Not on file    Emotionally Abused: Not on file    Physically Abused: Not on file    Sexually Abused: Not on file   Housing Stability:     Unable to Pay for Housing in the Last Year: Not on file    Number of [place] in the Last Year: Not on file    Unstable Housing in the Last Year: Not on file     Family History   Problem Relation Age of Onset    Heart Disease Mother     Diabetes Mother     Other Father 66        Lymphoma    Diabetes Sister     Other Sister         HLP    High Blood Pressure Brother     No Known Problems Brother  Other Brother 52        Suicide    Asthma Daughter     Other Daughter         Chiari Malformation     Current Outpatient Medications   Medication Sig Dispense Refill    CONTOUR NEXT TEST strip Test 10 times daily 900 each 5    SYNTHROID 75 MCG tablet Take 1 tablet by mouth every morning (before breakfast) 90 tablet 1    ESTRACE VAGINAL 0.1 MG/GM vaginal cream       mirabegron (MYRBETRIQ) 50 MG TB24 Take 50 mg by mouth daily      simvastatin (ZOCOR) 20 MG tablet Take 1 tablet by mouth every evening 90 tablet 1    ezetimibe (ZETIA) 10 MG tablet Take 1 tablet by mouth daily 90 tablet 1    insulin aspart (NOVOLOG) 100 UNIT/ML injection vial In insulin pump, total daily dose 150 units 15 vial 1    fluticasone (CUTIVATE) 0.05 % cream       clobetasol (TEMOVATE) 0.05 % cream as needed       omeprazole (PRILOSEC) 20 MG delayed release capsule Daily       fluticasone propionate (CUTIVATE) 0.05 % LOTN lotion APPLY TO THE ITCHY RASH ON THE LOWER LEGS AND THIGHS DAILY TILL CLEAR**[place] [id number] HAS IN STOCK**  3    Cholecalciferol (VITAMIN D) 2000 units CAPS capsule Take 1 tablet alternating with 2 tablets every other day 30 capsule 0    nortriptyline (PAMELOR) 10 MG capsule Take 40 mg by mouth nightly       gabapentin (NEURONTIN) 600 MG tablet Take 600 mg by mouth three times daily.  traMADol (ULTRAM) 50 MG tablet Take 50 mg by mouth as needed.  lisinopril-hydrochlorothiazide (PRINZIDE;ZESTORETIC) 20-25 MG per tablet Take 1 tablet by mouth daily      MULTIPLE VITAMIN PO Take 1 tablet by mouth daily      hydroxychloroquine (PLAQUENIL) 200 MG tablet Take 200 mg by mouth 2 times daily      Inulin (FIBER CHOICE PO) Take by mouth daily       triamcinolone (KENALOG) 0.1 % cream Apply topically 2 times daily Apply topically 2 times daily.  doxepin (SINEQUAN) 10 MG capsule daily  (Patient not taking: Reported on 1/20/2022)       No current facility-administered medications for this visit. Allergies   Allergen Reactions    Sulfa Antibiotics Hives    Hydrocodone-Acetaminophen Nausea And Vomiting     Family Status   Relation Name Status    Mother      Father     [place] Sister  (Not Specified)    Brother  Alive    Brother  Alive    Brother      Suzan  Alive       Review of Systems  Constitutional: has fatigue, no fever, no recent weight gain, no recent weight loss, no changes in appetite  Eyes: no eye pain, no change in vision, no eye redness, no eye irritation, no double vision  Ears, nose, throat: no nasal congestion, no sore throat, no earache, no decrease in hearing, no hoarseness, no dry mouth, no sinus problems, no difficulty swallowing, no neck lumps, no dental problems, no mouth sores, no ringing in ears  Pulmonary: no shortness of breath, no wheezing, no dyspnea on exertion, no cough  Cardiovascular: no chest pain, no lower extremity edema, no orthopnea, no intermittent leg claudication, no palpitations  Gastrointestinal: no abdominal pain, no nausea, no vomiting, no diarrhea, no constipation, no dysphagia, no heartburn, no bloating  Genitourinary: no dysuria, no urinary incontinence, no urinary hesitancy, no urinary frequency, no feelings of urinary urgency, no nocturia  Musculoskeletal: has joint swelling, has joint stiffness, has joint pain, no muscle cramps, has muscle pain  Integument/Breast: no hair loss, no skin rashes, no skin lesions, no itching, no dry skin  Neurological: no numbness, no tingling, no weakness, no confusion, no headaches, no dizziness, no fainting, no tremors, no decrease in memory, no balance problems  Psychiatric: no anxiety, no depression, no insomnia  Hematologic/Lymphatic: no tendency for easy bleeding, no swollen lymph nodes, no tendency for easy bruising  Immunology: no seasonal allergies, no frequent infections, no frequent illnesses  Endocrine: has temperature intolerance, no hirsutism, has hot flashes    OBJECTIVE:  /82 Pulse 78   Temp 98 °F (36.7 °C)   Resp 14   Ht 5' 8\" (1.727 m)   Wt 239 lb (108.4 kg)   SpO2 99%   BMI 36.34 kg/m²      Wt Readings from Last 3 Encounters:   04/28/22 239 lb (108.4 kg)   01/20/22 242 lb (109.8 kg)   09/30/21 237 lb 6.4 oz (107.7 kg)         Constitutional: no acute distress, well appearing and well nourished  Psychiatric: oriented to person, place and time, judgement and insight and normal, recent and remote memory and intact and mood and affect are normal  Skin: skin and subcutaneous tissue is normal without mass, normal turgor  Head and Face: examination of head and face revealed no abnormalities  Eyes: no lid or conjunctival swelling, erythema or discharge, pupils are normal, equal, round, reactive to light  Ears/Nose: external inspection of ears and nose revealed no abnormalities, hearing is grossly normal  Oropharynx/Mouth/Face: lips, tongue and gums are normal with no lesions, the voice quality was normal  Neck: neck is supple and symmetric, with midline trachea and no masses, thyroid is normal  Lymphatics: normal cervical lymph nodes, normal supraclavicular nodes  Pulmonary: no increased work of breathing or signs of respiratory distress, lungs are clear to auscultation  Cardiovascular: normal heart rate and rhythm, normal S1 and S2, no murmurs and pedal pulses and 2+ bilaterally, No edema  Abdomen: abdomen is soft, non-tender with no masses  Musculoskeletal: normal gait and station and exam of the digits and nails are normal  Neurological: normal coordination and normal general cortical function    Visual inspection:  Deformity/amputation: has hammer toes, no amputation  Skin lesions/pre-ulcerative calluses: absent  Edema: right- negative, left- negative     Sensory exam:  Monofilament sensation: normal  (minimum of 5 random plantar locations tested, avoiding callused areas - > 1 area with absence of sensation is + for neuropathy)     Plus at least one of the following:  Pulses: normal Proprioception: Intact  Vibration (128 Hz): Impaired     ASSESSMENT/PLAN:  1. Type 2 diabetes, controlled, with neuropathy (HCC)  Insulin pump tracing shows very appropriate diabetes control, no significant hyper or hypoglycemia. Recommend to continue current rates and ratios  Humalog in pump, 150 units daily  Average , SD 24  Basal amount 52%, Bolus amount 48%  HbA1c 4.0-0.7-7.3-8.2-7.0-3.1-5.4-5.4-6.5-2.4-8.5  Stopped Trulicity due to side effects, diarrhea  Test 10 times daily.  - Hemoglobin A1C; Future  - Comprehensive Metabolic Panel; Future    2. Mixed hyperlipidemia  LDL [id number]  - simvastatin  - ezetimibe (ZETIA) 10 MG tablet; Take 1 tablet by mouth daily    - Lipid Panel; Future    3. Hashimoto's thyroiditis  Follow  TSH 2.1-3.3-2.3-1.4-2.7-2.58-2.21-2.48    4. Goiter  Thyroid sono  TSH 2.7-2.58-2.21    5. Vitamin D deficiency  25 hydroxy vitamin D [id number]  On vitamin D 3000 IU daily  - Vitamin D 25 Hydroxy; Future    6. Essential hypertension  - lisinopril-hydrochlorothiazide (PRINZIDE;ZESTORETIC) 20-25 MG per tablet; Take 1 tablet by mouth daily    7. Obesity  Diet, exercise  History of weight:  02/06/20 240 lb (108.9 kg)   11/07/19 233 lb (105.7 kg)   08/02/19 233 lb (105.7 kg)     05/03/19 232 lb 9.6 oz (105.5 kg)     02/01/19 229 lb 6.4 oz (104.1 kg)     Phentermine 7/30/2018-10/30/2018  Weight 229 lbs 2/1/2019  Weight 237 lbs 7/30/2018. Weight 231 lbs 8/29/2018  Weight 230 lbs 12.8 oz on 9/25/2018  Weight 230 lbs on 10/31/2018    8. Acquired hypothyroidism  TSH 0.9-1.3-2.1-3.3-2.3-2.3-2.77-2.58  Continue Synthroid 0.075 mg qd  - T4, Free; Future  - TSH without Reflex; Future    9. MCTD  Continue Rheumatology follow up.     10.  Microalbuminuria  Microalbumin creatinine ratio is fluctuating  44-15-12-53  On lisinopril      Reviewed and/or ordered clinical lab results Yes  Reviewed and/or ordered radiology tests Yes  Reviewed and/or ordered other diagnostic tests No  Discussed test results with performing physician No  Independently reviewed image, tracing, or specimen Yes Insulin pump data, settings, tracing, total 7 pages. See scanned document  Made a decision to obtain old records No  Reviewed old records Yes  Obtained history from other than patient No    Lonza Pin was counseled regarding symptoms of diabetes diagnosis, hypothyroidism course and complications of disease if inadequately treated, side effects of medications, diagnosis, treatment options, and prognosis, risks, benefits, complications, and alternatives of treatment, labs, imaging and other studies and treatment targets and goals, insulin pump records, settings  She understands instructions and counseling    Total time I spent for this encounter 40 minutes    Return in about 3 months (around 7/28/2022) for diabetes.

## 2022-07-11 DIAGNOSIS — E06.3 HASHIMOTO'S THYROIDITIS: ICD-10-CM

## 2022-07-11 DIAGNOSIS — E03.9 ACQUIRED HYPOTHYROIDISM: ICD-10-CM

## 2022-07-11 RX ORDER — LEVOTHYROXINE SODIUM 75 MCG
75 TABLET ORAL
Qty: 90 TABLET | Refills: 0 | Status: SHIPPED | OUTPATIENT
Start: 2022-07-11 | End: 2022-10-24 | Stop reason: SDUPTHER

## 2022-07-14 ENCOUNTER — TELEPHONE (OUTPATIENT)
Dept: ENDOCRINOLOGY | Age: 62
End: 2022-07-14

## 2022-07-14 NOTE — TELEPHONE ENCOUNTER
Please see medtronic's message regarding pt:    \"I just spoke w/ Loly Ortez. If you can let Dr. Kelly Partida know we adjusted a couple of settings in her pump:    1. Changed her 12a carb ratio from 13 to 11  2. Adjusted her 3a-6a basal rate from 1.00 to 1.20    Patient was c/o high fasting numbers in the am.    Thanks dear!     Atul Pina RN, BSN\"

## 2022-07-29 LAB
ALBUMIN/GLOBULIN RATIO: 1.8 RATIO (ref 0.8–2.6)
ALBUMIN: 4.2 G/DL (ref 3.5–5.2)
ALP BLD-CCNC: 48 U/L (ref 23–144)
ALT SERPL-CCNC: 23 U/L (ref 0–60)
AST SERPL-CCNC: 20 U/L (ref 0–55)
BILIRUB SERPL-MCNC: 0.4 MG/DL (ref 0–1.2)
BUN BLDV-MCNC: 21 MG/DL (ref 3–29)
BUN/CREAT BLD: 19 (ref 7–25)
CALCIUM SERPL-MCNC: 9.5 MG/DL (ref 8.5–10.5)
CHLORIDE BLD-SCNC: 102 MEQ/L (ref 96–110)
CHOLESTEROL: 130 MG/DL
CO2: 23 MEQ/L (ref 19–32)
CREAT SERPL-MCNC: 1.1 MG/DL (ref 0.5–1.2)
CREATININE URINE: 206.5 MG/DL
GLOBULIN: 2.4 G/DL (ref 1.9–3.6)
GLOMERULAR FILTRATION RATE: 57 MLS/MIN/1.73M2
GLUCOSE BLD-MCNC: 134 MG/DL (ref 70–99)
HBA1C MFR BLD: 7.4 % (ref 4–6)
HDLC SERPL-MCNC: 56 MG/DL
LDL CHOLESTEROL CALCULATED: 53 MG/DL
MICROALBUMIN UR-MCNC: 8230 MCG/DL
MICROALBUMIN/CREAT UR-RTO: 40 MCG/MG CREAT.
POTASSIUM SERPL-SCNC: 3.9 MEQ/L (ref 3.4–5.3)
SODIUM BLD-SCNC: 136 MEQ/L (ref 135–148)
STATUS: ABNORMAL
T3 FREE: 2.5 PG/ML (ref 2.3–4.2)
T4 FREE: 1.48 NG/DL (ref 0.8–1.8)
TOTAL PROTEIN: 6.6 G/DL (ref 6–8.3)
TRIGL SERPL-MCNC: 106 MG/DL
TSH SERPL DL<=0.05 MIU/L-ACNC: 1.38 MCIU/ML (ref 0.4–4.5)
VITAMIN D 25-HYDROXY: 40 NG/ML (ref 30–100)
VLDLC SERPL CALC-MCNC: 21 MG/DL (ref 4–38)

## 2022-08-04 ENCOUNTER — TELEMEDICINE (OUTPATIENT)
Dept: ENDOCRINOLOGY | Age: 62
End: 2022-08-04
Payer: COMMERCIAL

## 2022-08-04 DIAGNOSIS — E11.40 TYPE 2 DIABETES, CONTROLLED, WITH NEUROPATHY (HCC): Primary | ICD-10-CM

## 2022-08-04 DIAGNOSIS — I10 ESSENTIAL HYPERTENSION: ICD-10-CM

## 2022-08-04 DIAGNOSIS — E03.9 ACQUIRED HYPOTHYROIDISM: ICD-10-CM

## 2022-08-04 DIAGNOSIS — R80.9 MICROALBUMINURIA: ICD-10-CM

## 2022-08-04 DIAGNOSIS — M35.1 MCTD (MIXED CONNECTIVE TISSUE DISEASE) (HCC): ICD-10-CM

## 2022-08-04 DIAGNOSIS — E06.3 HASHIMOTO'S THYROIDITIS: ICD-10-CM

## 2022-08-04 DIAGNOSIS — E78.2 MIXED HYPERLIPIDEMIA: ICD-10-CM

## 2022-08-04 DIAGNOSIS — E66.01 CLASS 2 SEVERE OBESITY WITH SERIOUS COMORBIDITY AND BODY MASS INDEX (BMI) OF 36.0 TO 36.9 IN ADULT, UNSPECIFIED OBESITY TYPE (HCC): ICD-10-CM

## 2022-08-04 DIAGNOSIS — E55.9 VITAMIN D DEFICIENCY: ICD-10-CM

## 2022-08-04 DIAGNOSIS — E04.9 GOITER: ICD-10-CM

## 2022-08-04 PROCEDURE — 99214 OFFICE O/P EST MOD 30 MIN: CPT | Performed by: INTERNAL MEDICINE

## 2022-08-04 PROCEDURE — 3051F HG A1C>EQUAL 7.0%<8.0%: CPT | Performed by: INTERNAL MEDICINE

## 2022-08-04 RX ORDER — INSULIN LISPRO 100 [IU]/ML
INJECTION, SOLUTION INTRAVENOUS; SUBCUTANEOUS
Qty: 150 ML | Refills: 1 | Status: SHIPPED | OUTPATIENT
Start: 2022-08-04

## 2022-08-04 NOTE — PROGRESS NOTES
Speedy Harris is a 65 y.o. female who presents for Type 2 diabetes mellitus. 2022    TELEHEALTH EVALUATION -- Audio/Visual (During FTSUJ-87 public health emergency)    Patient provided verbal consent to use the video visit. HPI:    Speedy Harris (:  1960) has requested an audio/video evaluation for the following concern(s):      Current symptoms/problems include  hyperglycemia  and are unchanged. 1.  Type 2 diabetes, controlled, with neuropathy ([address] [E11.40]    Diagnosed with Type 2 diabetes mellitus in . Comorbid conditions: Neuropathy  Medtronic MiniMed insulin pump 630G    Current diabetic medications include: Humalog    Intolerance to diabetes medications: Yes Metformin    Patient is having a lot of stress, her  is ill. Despite that, she manages her diabetes very well. She got severe muscle weakness in her legs  Difficult to walk, worse  Stopped working    Weight trend: stable  Prior visit with dietician: yes  Current diet: on average, 3 meals per day  Current exercise: frequent     Current monitoring regimen: home blood tests - 10 times daily  Has brought blood glucose log/meter:  Yes  Home blood sugar records: fasting range:  and postprandial range:   Any episodes of hypoglycemia? Yes  Hypoglycemia frequency and time(s):  2 times a week  Does patient have Glucagon emergency kit? No  Does patient have rapid acting carbohydrate? Yes  Does patient wear a medic alert bracelet or necklace? No    2. Mixed hyperlipidemia  Has muscle pain. Had problems with lipitor and crestor. 3. Hashimoto's thyroiditis  Has fatigue. 4. Goiter  No difficulty swallowing. 5. Vitamin D deficiency  Has fatigue. 6. Essential hypertension  No headaches. 7. Obesity  Eats healthy. Exercises. Tried low carb diet, eats healthy, unsuccessful with weight loss. 8. Hypothyroidism  Has fatigue    9. MCTD  Sees Rheumatologist  Has point pain, joint swelling, fatigue    10. Microalbuminuria  No urination problems      Narrative   THYROID ULTRASOUND, 3/4/2021 10:37 AM       HISTORY:      Reason for Exam:  Nontoxic goiter, unspecified. E04.9:    Nontoxic goiter, unspecified     . COMPARISON: 8/16/2017       TECHNIQUE: Grayscale and limited color Doppler imaging. FINDINGS:       RIGHT LOBE:  4.8 x 1.3 x 1.2 cm       LEFT LOBE:    4.8 x 1.8 x 1 cm       ISTHMUS:     0.2 cm       There is no cervical adenopathy. [IMPRESSION]       1. Normal thyroid. No nodule.  No change       Electronically Signed by: Nathan Barnett MD, 3/4/2021 3:03 PM         Lab Results   Component Value Date    LABA1C 7.4 (H) 07/29/2022     No results found for: EAG        Past Medical History:   Diagnosis Date    Asthma     Congenital connective tissue disorder     Depression     Fibromyalgia     HLP (hyperkeratosis lenticularis perstans)     Hypertension     Muscle weakness     Neuropathy     Type 2 diabetes mellitus without complication ([address]       Patient Active Problem List   Diagnosis    Type 2 diabetes, controlled, with neuropathy ([address]    Mixed hyperlipidemia    Hashimoto's thyroiditis    Goiter    Vitamin D deficiency    Essential hypertension    Class 2 obesity with body mass index (BMI) of 36.0 to 36.9 in adult    Acquired hypothyroidism    Acute hepatitis    Asthma    Atypical endometrial cells on Pap smear    Dyslipidemia    Fibromyalgia    Gastroesophageal reflux disease without esophagitis    Other chest pain    Postmenopausal bleeding    Type 2 diabetes mellitus with diabetic polyneuropathy (HCC)    MCTD (mixed connective tissue disease) ([address]    Microalbuminuria     Past Surgical History:   Procedure Laterality Date    CERVICAL BIOPSY  W/ LOOP ELECTRODE EXCISION  06/2022    COLONOSCOPY      DILATION AND CURETTAGE OF UTERUS      ERCP      URETER STENT PLACEMENT       Social History     Socioeconomic History    Marital status:      Spouse name: Not on file Number of children: Not on file    Years of education: Not on file    Highest education level: Not on file   Occupational History    Not on file   Tobacco Use    Smoking status: Former     Packs/day: 1.00     Years: 20.00     Pack years: 20.00     Types: Cigarettes     Quit date: 2010     Years since quittin.8    Smokeless tobacco: Never    Tobacco comments:     quit in    Vaping Use    Vaping Use: Never used   Substance and Sexual Activity    Alcohol use: Not Currently     Alcohol/week: 2.0 standard drinks     Types: 2 Glasses of wine per week    Drug use: No    Sexual activity: Yes     Partners: Male   Other Topics Concern    Not on file   Social History Narrative    Not on file     Social Determinants of Health     Financial Resource Strain: Not on file   Food Insecurity: Not on file   Transportation Needs: Not on file   Physical Activity: Not on file   Stress: Not on file   Social Connections: Not on file   Intimate Partner Violence: Not on file   Housing Stability: Not on file     Family History   Problem Relation Age of Onset    Heart Disease Mother     Diabetes Mother     Other Father 66        Lymphoma    Diabetes Sister     Other Sister         HLP    High Blood Pressure Brother     No Known Problems Brother     Other Brother 52        Suicide    Asthma Daughter     Other Daughter         Chiari Malformation     Current Outpatient Medications   Medication Sig Dispense Refill    insulin lispro (HUMALOG) 100 UNIT/ML SOLN injection vial In insulin pump, total daily dose 150 units 150 mL 1    SYNTHROID 75 MCG tablet Take 1 tablet by mouth every morning (before breakfast) 90 tablet 0    CONTOUR NEXT TEST strip Test 10 times daily 900 each 5    ESTRACE VAGINAL 0.1 MG/GM vaginal cream       mirabegron (MYRBETRIQ) 50 MG TB24 Take 50 mg by mouth daily      simvastatin (ZOCOR) 20 MG tablet Take 1 tablet by mouth every evening 90 tablet 1    ezetimibe (ZETIA) 10 MG tablet Take 1 tablet by mouth daily 90 tablet 1    fluticasone (CUTIVATE) 0.05 % cream       clobetasol (TEMOVATE) 0.05 % cream as needed       omeprazole (PRILOSEC) 20 MG delayed release capsule Daily       fluticasone propionate (CUTIVATE) 0.05 % LOTN lotion APPLY TO THE ITCHY RASH ON THE LOWER LEGS AND THIGHS DAILY TILL CLEAR**[id number] HAS IN STOCK**  3    Cholecalciferol (VITAMIN D) 2000 units CAPS capsule Take 1 tablet alternating with 2 tablets every other day 30 capsule 0    nortriptyline (PAMELOR) 10 MG capsule Take 40 mg by mouth nightly       gabapentin (NEURONTIN) 600 MG tablet Take 600 mg by mouth three times daily. traMADol (ULTRAM) 50 MG tablet Take 50 mg by mouth as needed. MULTIPLE VITAMIN PO Take 1 tablet by mouth daily      hydroxychloroquine (PLAQUENIL) 200 MG tablet Take 200 mg by mouth 2 times daily      Inulin (FIBER CHOICE PO) Take by mouth daily       triamcinolone (KENALOG) 0.1 % cream Apply topically 2 times daily Apply topically 2 times daily. doxepin (SINEQUAN) 10 MG capsule daily  (Patient not taking: No sig reported)      lisinopril-hydrochlorothiazide (PRINZIDE;ZESTORETIC) 20-25 MG per tablet Take 1 tablet by mouth daily       No current facility-administered medications for this visit.      Allergies   Allergen Reactions    Sulfa Antibiotics Hives    Hydrocodone-Acetaminophen Nausea And Vomiting     Family Status   Relation Name Status    Mother      Father      Sister  (Not Specified)    Brother  Alive    Brother  Alive    Brother      Suzan  Alive       Review of Systems  Constitutional: has fatigue, no fever, no recent weight gain, no recent weight loss, no changes in appetite  Eyes: no eye pain, no change in vision, no eye redness, no eye irritation, no double vision  Ears, nose, throat: no nasal congestion, no sore throat, no earache, no decrease in hearing, no hoarseness, no dry mouth, no sinus problems, no difficulty swallowing, no neck lumps, no dental problems, no mouth sores, no ringing in ears  Pulmonary: no shortness of breath, no wheezing, no dyspnea on exertion, no cough  Cardiovascular: no chest pain, no lower extremity edema, no orthopnea, no intermittent leg claudication, no palpitations  Gastrointestinal: no abdominal pain, no nausea, no vomiting, no diarrhea, no constipation, no dysphagia, no heartburn, no bloating  Genitourinary: no dysuria, no urinary incontinence, no urinary hesitancy, no urinary frequency, no feelings of urinary urgency, no nocturia  Musculoskeletal: has joint swelling, has joint stiffness, has joint pain, no muscle cramps, has muscle pain  Integument/Breast: no hair loss, no skin rashes, no skin lesions, no itching, no dry skin  Neurological: no numbness, no tingling, no weakness, no confusion, no headaches, no dizziness, no fainting, no tremors, no decrease in memory, no balance problems  Psychiatric: no anxiety, no depression, no insomnia  Hematologic/Lymphatic: no tendency for easy bleeding, no swollen lymph nodes, no tendency for easy bruising  Immunology: no seasonal allergies, no frequent infections, no frequent illnesses  Endocrine: has temperature intolerance, no hirsutism, has hot flashes    OBJECTIVE:  There were no vitals taken for this visit.      Wt Readings from Last 3 Encounters:   04/28/22 239 lb (108.4 kg)   01/20/22 242 lb (109.8 kg)   09/30/21 237 lb 6.4 oz (107.7 kg)       OBJECTIVE:  Constitutional: no apparent distress, well developed and well nourished  Mental status: alert and awake, oriented to person, place and time, able to follow commands  Psychiatric: judgement and insight and normal, recent and remote memory are intact, mood and affect are normal  Skin: skin inspection appears normal, no significant exanthematous lesions or discoloration noted on facial skin  Head and Face: head and face inspection revealed no abnormalities, normocephalic, atraumatic  Eyes: no lid or conjunctival swelling, erythema or discharge, sclera appears normal  Ears/Nose: external inspection of ears and nose revealed no abnormalities, hearing is grossly normal  Oropharynx/Mouth/Face: lips are normal with no lesions, the voice quality was normal  Neck: neck is symmetric, no visualized mass  Pulmonary/chest: respiratory effort normal, no generalized signs of difficulty breathing or signs of respiratory distress  Musculoskeletal: normal station, normal range of motion of neck  Neurological: no facial asymmetry, normal general cortical function       ASSESSMENT/PLAN:  1. Type 2 diabetes, controlled, with neuropathy ([address]  Change ICR for dinner 4:00 1:8  Change basal rate 00:00 AM - 6:00AM 1.3 u/h  Patient is experiencing hyperglycemia after she had endometrial biopsy. She is having stress. HbA1c 0.1-8.9-1.3-9.0-9.0-6.2-1.4-8.0-7.0-6.7-1.1-0.5  Stopped Trulicity due to side effects, diarrhea  Test 10 times daily.  - Hemoglobin A1C; Future  - Comprehensive Metabolic Panel; Future    2. Mixed hyperlipidemia  LDL [id number]  - simvastatin  - ezetimibe (ZETIA) 10 MG tablet; Take 1 tablet by mouth daily    - Lipid Panel; Future    3. Hashimoto's thyroiditis  Follow  TSH 2.1-3.3-2.3-1.4-2.7-2.58-2.21-2.48-1.38    4. Goiter  Thyroid sono  TSH 2.7-2.58-2.21-1.38    5. Vitamin D deficiency  25 hydroxy vitamin D [id number]  On vitamin D 3000 IU daily  - Vitamin D 25 Hydroxy; Future    6. Essential hypertension  - lisinopril-hydrochlorothiazide (PRINZIDE;ZESTORETIC) 20-25 MG per tablet; Take 1 tablet by mouth daily    7. Obesity  Diet, exercise  History of weight:  02/06/20 240 lb (108.9 kg)   11/07/19 233 lb (105.7 kg)   08/02/19 233 lb (105.7 kg)     05/03/19 232 lb 9.6 oz (105.5 kg)     02/01/19 229 lb 6.4 oz (104.1 kg)     Phentermine 7/30/2018-10/30/2018  Weight 229 lbs 2/1/2019  Weight 237 lbs 7/30/2018. Weight 231 lbs 8/29/2018  Weight 230 lbs 12.8 oz on 9/25/2018  Weight 230 lbs on 10/31/2018    8.  Acquired hypothyroidism  TSH 0.9-1.3-2.1-3.3-2.3-2.3-2.77-2.58  Continue Synthroid 0.075 mg qd  - T4, Free; Future  - TSH without Reflex; Future    9. MCTD  Continue Rheumatology follow up. 10.  Microalbuminuria  Microalbumin creatinine ratio is fluctuating  44-15-12-53-40  On lisinopril      Reviewed and/or ordered clinical lab results Yes  Reviewed and/or ordered radiology tests Yes  Reviewed and/or ordered other diagnostic tests No  Discussed test results with performing physician No  Independently reviewed image, tracing, or specimen Yes Insulin pump data, settings, tracing, total 7 pages. See scanned document  Made a decision to obtain old records No  Reviewed old records Yes  Obtained history from other than patient No    Darvin Damon was counseled regarding symptoms of diabetes diagnosis, hypothyroidism course and complications of disease if inadequately treated, side effects of medications, diagnosis, treatment options, and prognosis, risks, benefits, complications, and alternatives of treatment, labs, imaging and other studies and treatment targets and goals, insulin pump records, settings  She understands instructions and counseling    Darvin Damon is a 65 y.o. female was evaluated through a synchronous (real-time) audio-video encounter. The patient (or guardian if applicable) is aware that this is billable service, which includes applicable co-pays. This virtual visit was conducted with the patient's (and/or legal guardian's) consent. The visit was conducted pursuant to the emergency declaration under the [address] [place] [address], [address] [place] [address] authority and the [employer] [place] Act. Patient identification was verified, and a caregiver was present when appropriate. I conducted an interview, performed a limited exam by video and educated the patient on my assessment and plan.     Due to this being a TeleHealth encounter (During NLTTA-95 public health emergency), evaluation of the following organ systems was limited: Vitals/Constitutional/EENT/Resp/CV/GI//MS/Neuro/Skin/Heme-Lymph-Imm. The patient (and/or legal guardian) has also been advised to contact this office for worsening conditions or problems, and seek emergency medical treatment and/or call 911 if deemed necessary. Total time spent on this encounter via Telehealth (synchronous, real-time audio/visual connection):  30  min  See assessment, plan and counseling note for counseling and care coordination details. Persons participating in the telehealth service: provider - Seb Vazquez MD and patient Pito Salmon. Provider was located at her office. Patient was located at home. The patient was located in the state where the provider was licensed to provide care. --Seb Vazquez MD on 8/4/2022 at 11:44 PM    An electronic signature was used to authenticate this note. Return in about 3 months (around 11/4/2022) for diabetes.

## 2022-10-17 DIAGNOSIS — E06.3 HASHIMOTO'S THYROIDITIS: ICD-10-CM

## 2022-10-17 DIAGNOSIS — E03.9 ACQUIRED HYPOTHYROIDISM: ICD-10-CM

## 2022-10-17 RX ORDER — LEVOTHYROXINE SODIUM 75 MCG
75 TABLET ORAL
Qty: 90 TABLET | Refills: 0 | OUTPATIENT
Start: 2022-10-17

## 2022-10-24 RX ORDER — LEVOTHYROXINE SODIUM 75 MCG
75 TABLET ORAL
Qty: 90 TABLET | Refills: 0 | Status: SHIPPED | OUTPATIENT
Start: 2022-10-24

## 2022-10-24 NOTE — TELEPHONE ENCOUNTER
Pt called back because her refill was denied for her Synthroid.  She scheduled her follow up and will need her rx sent to [facility]

## 2022-11-15 DIAGNOSIS — E11.40 TYPE 2 DIABETES, CONTROLLED, WITH NEUROPATHY (HCC): ICD-10-CM

## 2022-11-15 RX ORDER — PERPHENAZINE 16 MG/1
TABLET, FILM COATED ORAL
Qty: 900 EACH | Refills: 1 | Status: SHIPPED | OUTPATIENT
Start: 2022-11-15

## 2023-01-03 ENCOUNTER — PATIENT MESSAGE (OUTPATIENT)
Dept: ENDOCRINOLOGY | Age: 63
End: 2023-01-03

## 2023-02-01 NOTE — TELEPHONE ENCOUNTER
From: Rebecca Camacho  Sent: 1/31/2023 3:03 PM EST  To: [facility] [place]  Subject: Blood work     I go outside [facility] because my insurance company has [facility] as in my network. I asked [facility] if my order was still good and they told me no it wasn't.  Maybe they got confused and could you have Dr. Talisha De Paz send a new prescription to [facility] for my pump supplies    Thank you

## 2023-02-14 LAB
ALBUMIN/GLOBULIN RATIO: 1.8 RATIO (ref 0.8–2.6)
ALBUMIN: 4.3 G/DL (ref 3.5–5.2)
ALP BLD-CCNC: 49 U/L (ref 23–144)
ALT SERPL-CCNC: 28 U/L (ref 0–60)
AST SERPL-CCNC: 29 U/L (ref 0–55)
BILIRUB SERPL-MCNC: 0.6 MG/DL (ref 0–1.2)
BUN BLDV-MCNC: 19 MG/DL (ref 3–29)
BUN/CREAT BLD: 21 (ref 7–25)
CALCIUM SERPL-MCNC: 10.1 MG/DL (ref 8.5–10.5)
CHLORIDE BLD-SCNC: 103 MEQ/L (ref 96–110)
CHOLESTEROL: 159 MG/DL
CO2: 24 MEQ/L (ref 19–32)
CREAT SERPL-MCNC: 0.9 MG/DL (ref 0.5–1.2)
CREATININE URINE: 59.1 MG/DL
GLOBULIN: 2.4 G/DL (ref 1.9–3.6)
GLOMERULAR FILTRATION RATE: 73 MLS/MIN/1.73M2
GLUCOSE BLD-MCNC: 128 MG/DL (ref 70–99)
HBA1C MFR BLD: 6.5 % (ref 4–6)
HDLC SERPL-MCNC: 72 MG/DL
LDL CHOLESTEROL CALCULATED: 68 MG/DL
MICROALBUMIN UR-MCNC: 1270 MCG/DL
MICROALBUMIN/CREAT UR-RTO: 21 MCG/MG CREAT.
POTASSIUM SERPL-SCNC: 4.2 MEQ/L (ref 3.4–5.3)
SODIUM BLD-SCNC: 139 MEQ/L (ref 135–148)
STATUS: ABNORMAL
T3 FREE: 2.5 PG/ML (ref 2.3–4.2)
T4 FREE: 1.32 NG/DL (ref 0.8–1.8)
TOTAL PROTEIN: 6.7 G/DL (ref 6–8.3)
TRIGL SERPL-MCNC: 97 MG/DL
TSH SERPL DL<=0.05 MIU/L-ACNC: 2.36 MCIU/ML (ref 0.4–4.5)
VITAMIN D 25-HYDROXY: 38 NG/ML (ref 30–100)
VLDLC SERPL CALC-MCNC: 19 MG/DL (ref 4–38)

## 2023-02-20 ENCOUNTER — OFFICE VISIT (OUTPATIENT)
Dept: ENDOCRINOLOGY | Age: 63
End: 2023-02-20
Payer: OTHER GOVERNMENT

## 2023-02-20 VITALS
OXYGEN SATURATION: 100 % | RESPIRATION RATE: 14 BRPM | HEART RATE: 90 BPM | HEIGHT: 68 IN | TEMPERATURE: 98 F | WEIGHT: 231 LBS | DIASTOLIC BLOOD PRESSURE: 72 MMHG | SYSTOLIC BLOOD PRESSURE: 136 MMHG | BODY MASS INDEX: 35.01 KG/M2

## 2023-02-20 DIAGNOSIS — M35.1 MCTD (MIXED CONNECTIVE TISSUE DISEASE) (HCC): ICD-10-CM

## 2023-02-20 DIAGNOSIS — E78.2 MIXED HYPERLIPIDEMIA: ICD-10-CM

## 2023-02-20 DIAGNOSIS — R80.9 MICROALBUMINURIA: ICD-10-CM

## 2023-02-20 DIAGNOSIS — E66.01 CLASS 2 SEVERE OBESITY WITH SERIOUS COMORBIDITY AND BODY MASS INDEX (BMI) OF 36.0 TO 36.9 IN ADULT, UNSPECIFIED OBESITY TYPE (HCC): ICD-10-CM

## 2023-02-20 DIAGNOSIS — I10 ESSENTIAL HYPERTENSION: ICD-10-CM

## 2023-02-20 DIAGNOSIS — E03.9 ACQUIRED HYPOTHYROIDISM: ICD-10-CM

## 2023-02-20 DIAGNOSIS — E04.9 GOITER: ICD-10-CM

## 2023-02-20 DIAGNOSIS — E11.40 TYPE 2 DIABETES, CONTROLLED, WITH NEUROPATHY (HCC): Primary | ICD-10-CM

## 2023-02-20 DIAGNOSIS — E06.3 HASHIMOTO'S THYROIDITIS: ICD-10-CM

## 2023-02-20 DIAGNOSIS — E55.9 VITAMIN D DEFICIENCY: ICD-10-CM

## 2023-02-20 PROCEDURE — 3078F DIAST BP <80 MM HG: CPT | Performed by: INTERNAL MEDICINE

## 2023-02-20 PROCEDURE — 99215 OFFICE O/P EST HI 40 MIN: CPT | Performed by: INTERNAL MEDICINE

## 2023-02-20 PROCEDURE — 3044F HG A1C LEVEL LT 7.0%: CPT | Performed by: INTERNAL MEDICINE

## 2023-02-20 PROCEDURE — 3075F SYST BP GE 130 - 139MM HG: CPT | Performed by: INTERNAL MEDICINE

## 2023-02-20 RX ORDER — INSULIN LISPRO 100 [IU]/ML
INJECTION, SOLUTION INTRAVENOUS; SUBCUTANEOUS
Qty: 150 ML | Refills: 1 | Status: SHIPPED | OUTPATIENT
Start: 2023-02-20

## 2023-02-20 RX ORDER — LEVOTHYROXINE SODIUM 75 MCG
75 TABLET ORAL
Qty: 90 TABLET | Refills: 0 | Status: SHIPPED | OUTPATIENT
Start: 2023-02-20

## 2023-02-20 NOTE — PROGRESS NOTES
Santos Medeiros is a 65 y.o. female who presents for Type 2 diabetes mellitus. Current symptoms/problems include  leg weakness  and are unchanged. 1.  Type 2 diabetes, controlled, with neuropathy ([address] [E11.40]     Diagnosed with Type 2 diabetes mellitus in 2004. Comorbid conditions: Neuropathy  Medtronic MiniMed insulin pump 630G     Current diabetic medications include: Humalog     Intolerance to diabetes medications: Yes Metformin     Patient is having a lot of stress. Despite that, she manages her diabetes very well. She got severe muscle weakness in her legs  Difficult to walk, worse  Stopped working     Weight trend: stable  Prior visit with dietician: yes  Current diet: on average, 3 meals per day  Current exercise: frequent     Current monitoring regimen: home blood tests - 10 times daily  Has brought blood glucose log/meter:  Yes  Home blood sugar records: fasting range:  and postprandial range:   Any episodes of hypoglycemia? Yes  Hypoglycemia frequency and time(s):  2 times a week  Does patient have Glucagon emergency kit? No  Does patient have rapid acting carbohydrate? Yes  Does patient wear a medic alert bracelet or necklace? No     2. Mixed hyperlipidemia  Has muscle pain. Had problems with lipitor and crestor. 3. Hashimoto's thyroiditis  Has fatigue. 4. Goiter  No difficulty swallowing. 5. Vitamin D deficiency  Has fatigue. 6. Essential hypertension  No headaches. 7. Obesity  Eats healthy. Tried low carb diet, eats healthy. 8. Hypothyroidism  Has fatigue     9. MCTD  Sees Rheumatologist  Has point pain, joint swelling, fatigue     10. Microalbuminuria  No urination problems         Narrative   THYROID ULTRASOUND, 3/4/2021 10:37 AM       HISTORY:      Reason for Exam:  Nontoxic goiter, unspecified. E04.9:    Nontoxic goiter, unspecified     . COMPARISON: 8/16/2017       TECHNIQUE: Grayscale and limited color Doppler imaging. FINDINGS:       RIGHT LOBE:  4.8 x 1.3 x 1.2 cm       LEFT LOBE:    4.8 x 1.8 x 1 cm       ISTHMUS:     0.2 cm       There is no cervical adenopathy. [IMPRESSION]       1. Normal thyroid. No nodule.  No change       Electronically Signed by: Nataly Quiñones MD, 3/4/2021 3:03 PM         Lab Results   Component Value Date    LABA1C 6.5 (H) 02/14/2023     No results found for: EAG        Past Medical History:   Diagnosis Date    Asthma     Congenital connective tissue disorder     Depression     Fibromyalgia     HLP (hyperkeratosis lenticularis perstans)     Hypertension     Muscle weakness     Neuropathy     Type 2 diabetes mellitus without complication ([address]       Patient Active Problem List   Diagnosis    Type 2 diabetes, controlled, with neuropathy ([address]    Mixed hyperlipidemia    Hashimoto's thyroiditis    Goiter    Vitamin D deficiency    Essential hypertension    Class 2 obesity with body mass index (BMI) of 36.0 to 36.9 in adult    Acquired hypothyroidism    Acute hepatitis    Asthma    Atypical endometrial cells on Pap smear    Dyslipidemia    Fibromyalgia    Gastroesophageal reflux disease without esophagitis    Other chest pain    Postmenopausal bleeding    Type 2 diabetes mellitus with diabetic polyneuropathy (HCC)    MCTD (mixed connective tissue disease) ([address]    Microalbuminuria     Past Surgical History:   Procedure Laterality Date    CERVICAL BIOPSY  W/ LOOP ELECTRODE EXCISION  06/2022    COLONOSCOPY      DILATION AND CURETTAGE OF UTERUS      ERCP      URETER STENT PLACEMENT       Social History     Socioeconomic History    Marital status:      Spouse name: Not on file    Number of children: Not on file    Years of education: Not on file    Highest education level: Not on file   Occupational History    Not on file   Tobacco Use    Smoking status: Former     Packs/day: 1.00     Years: 20.00     Pack years: 20.00     Types: Cigarettes     Quit date: 9/24/2010     Years since quittin.4    Smokeless tobacco: Never    Tobacco comments:     quit in 2010   Vaping Use    Vaping Use: Never used   Substance and Sexual Activity    Alcohol use: Not Currently     Alcohol/week: 2.0 standard drinks     Types: 2 Glasses of wine per week    Drug use: No    Sexual activity: Yes     Partners: Male   Other Topics Concern    Not on file   Social History Narrative    Not on file     Social Determinants of Health     Financial Resource Strain: Not on file   Food Insecurity: Not on file   Transportation Needs: Not on file   Physical Activity: Not on file   Stress: Not on file   Social Connections: Not on file   Intimate Partner Violence: Not on file   Housing Stability: Not on file     Family History   Problem Relation Age of Onset    Heart Disease Mother     Diabetes Mother     Other Father 66        Lymphoma    Diabetes Sister     Other Sister         HLP    High Blood Pressure Brother     No Known Problems Brother     Other Brother 52        Suicide    Asthma Daughter     Other Daughter         Chiari Malformation     Current Outpatient Medications   Medication Sig Dispense Refill    insulin lispro (HUMALOG) 100 UNIT/ML SOLN injection vial In insulin pump, total daily dose 150 units 150 mL 1    SYNTHROID 75 MCG tablet Take 1 tablet by mouth every morning (before breakfast) 90 tablet 0    CONTOUR NEXT TEST strip Test 10 times daily 900 each 1    ESTRACE VAGINAL 0.1 MG/GM vaginal cream       mirabegron (MYRBETRIQ) 50 MG TB24 Take 50 mg by mouth daily      simvastatin (ZOCOR) 20 MG tablet Take 1 tablet by mouth every evening 90 tablet 1    ezetimibe (ZETIA) 10 MG tablet Take 1 tablet by mouth daily 90 tablet 1    fluticasone (CUTIVATE) 0.05 % cream       clobetasol (TEMOVATE) 0.05 % cream as needed       omeprazole (PRILOSEC) 20 MG delayed release capsule Daily       fluticasone propionate (CUTIVATE) 0.05 % LOTN lotion APPLY TO THE ITCHY RASH ON THE LOWER LEGS AND THIGHS DAILY TILL CLEAR**[id number] HAS IN STOCK**  3    Cholecalciferol (VITAMIN D) 2000 units CAPS capsule Take 1 tablet alternating with 2 tablets every other day 30 capsule 0    nortriptyline (PAMELOR) 10 MG capsule Take 40 mg by mouth nightly       gabapentin (NEURONTIN) 600 MG tablet Take 600 mg by mouth three times daily. traMADol (ULTRAM) 50 MG tablet Take 50 mg by mouth as needed. MULTIPLE VITAMIN PO Take 1 tablet by mouth daily      hydroxychloroquine (PLAQUENIL) 200 MG tablet Take 200 mg by mouth 2 times daily      Inulin (FIBER CHOICE PO) Take by mouth daily       triamcinolone (KENALOG) 0.1 % cream Apply topically 2 times daily Apply topically 2 times daily. lisinopril-hydrochlorothiazide (PRINZIDE;ZESTORETIC) 20-25 MG per tablet Take 1 tablet by mouth daily       No current facility-administered medications for this visit.      Allergies   Allergen Reactions    Sulfa Antibiotics Hives    Hydrocodone-Acetaminophen Nausea And Vomiting     Family Status   Relation Name Status    Mother      Father      Sister  (Not Specified)    Brother  Alive    Brother  Alive    Brother      Suzan  Alive       Review of Systems  Constitutional: has fatigue, no fever, no recent weight gain, no recent weight loss, no changes in appetite  Eyes: no eye pain, no change in vision, no eye redness, no eye irritation, no double vision  Ears, nose, throat: no nasal congestion, no sore throat, no earache, no decrease in hearing, no hoarseness, no dry mouth, no sinus problems, no difficulty swallowing, no neck lumps, no dental problems, no mouth sores, no ringing in ears  Pulmonary: no shortness of breath, no wheezing, no dyspnea on exertion, no cough  Cardiovascular: no chest pain, no lower extremity edema, no orthopnea, no intermittent leg claudication, no palpitations  Gastrointestinal: no abdominal pain, no nausea, no vomiting, no diarrhea, no constipation, no dysphagia, no heartburn, no bloating  Genitourinary: no dysuria, no urinary incontinence, no urinary hesitancy, no urinary frequency, no feelings of urinary urgency, no nocturia  Musculoskeletal: has joint swelling, has joint stiffness, has joint pain, no muscle cramps, has muscle pain  Integument/Breast: no hair loss, no skin rashes, no skin lesions, no itching, no dry skin  Neurological: no numbness, no tingling, no weakness, no confusion, no headaches, no dizziness, no fainting, no tremors, no decrease in memory, no balance problems  Psychiatric: no anxiety, no depression, no insomnia  Hematologic/Lymphatic: no tendency for easy bleeding, no swollen lymph nodes, no tendency for easy bruising  Immunology: no seasonal allergies, no frequent infections, no frequent illnesses  Endocrine: has temperature intolerance, no hirsutism, has hot flashes    OBJECTIVE:  /72   Pulse 90   Temp 98 °F (36.7 °C)   Resp 14   Ht 5' 8\" (1.727 m)   Wt 231 lb (104.8 kg)   SpO2 100%   BMI 35.12 kg/m²      Wt Readings from Last 3 Encounters:   02/20/23 231 lb (104.8 kg)   04/28/22 239 lb (108.4 kg)   01/20/22 242 lb (109.8 kg)         Constitutional: no acute distress, well appearing and well nourished  Psychiatric: oriented to person, place and time, judgement and insight and normal, recent and remote memory and intact and mood and affect are normal  Skin: skin and subcutaneous tissue is normal without mass, normal turgor  Head and Face: examination of head and face revealed no abnormalities  Eyes: no lid or conjunctival swelling, erythema or discharge, pupils are normal, equal, round, reactive to light  Ears/Nose: external inspection of ears and nose revealed no abnormalities, hearing is grossly normal  Oropharynx/Mouth/Face: lips, tongue and gums are normal with no lesions, the voice quality was normal  Neck: neck is supple and symmetric, with midline trachea and no masses, thyroid is normal  Lymphatics: normal cervical lymph nodes, normal supraclavicular nodes  Pulmonary: no increased work of breathing or signs of respiratory distress, lungs are clear to auscultation  Cardiovascular: normal heart rate and rhythm, normal S1 and S2, no murmurs and pedal pulses and 2+ bilaterally, No edema  Abdomen: abdomen is soft, non-tender with no masses  Musculoskeletal: normal gait and station and exam of the digits and nails are normal  Neurological: normal coordination and normal general cortical function    Visual inspection:  Deformity/amputation: has hammer toes, no amputation  Skin lesions/pre-ulcerative calluses: absent  Edema: right- negative, left- negative     Sensory exam:  Monofilament sensation: normal  (minimum of 5 random plantar locations tested, avoiding callused areas - > 1 area with absence of sensation is + for neuropathy)     Plus at least one of the following:  Pulses: normal   Proprioception: Intact  Vibration (128 Hz): Impaired     ASSESSMENT/PLAN:    1. Type 2 diabetes, controlled, with neuropathy ([address]  Prefers Omnipod because has problems with tubing on Medtronic and prefers accurate CGM  Will call regarding lucila  Stopped Megace in 12/2022 because of nausea, dizziness, dry mouth, decreased urination. Reviewed pump data. Average blood glucose 147±33. Bolus amount 57%, basal amount 43%. Average carbohydrates [id number]. Blood glucose readings above target 14%, below target 0. Recommend to continue same rates and ratios. Continue watching diet, doing timely bolus injections. HbA1c 1.8-3.3-2.9-0.3-2.8-9.3-9.4-9.4-7.3-3.9-9.2-9.3-7.6  Stopped Trulicity due to side effects, diarrhea  Test 10 times daily.  - Hemoglobin A1C; Future  - Comprehensive Metabolic Panel; Future     2. Mixed hyperlipidemia  LDL 91-26-20-05-25-35-92-01-22-65-08-26-92  - simvastatin  - ezetimibe (ZETIA) 10 MG tablet; Take 1 tablet by mouth daily    - Lipid Panel; Future     3. Hashimoto's thyroiditis  Follow  TSH 2.1-3.3-2.3-1.4-2.7-2.58-2.21-2.48-1.38     4.  Goiter  Thyroid sono  TSH 2.7-2.58-2.21-1.38 5. Vitamin D deficiency  25 hydroxy vitamin D 23-56-02-21-38-12-76-11-47-21-63-75  Increase vitamin D 2000 IU daily  - Vitamin D 25 Hydroxy; Future     6. Essential hypertension  - lisinopril-hydrochlorothiazide (PRINZIDE;ZESTORETIC) 20-25 MG per tablet; Take 1 tablet by mouth daily     7. Obesity  Diet, exercise  History of weight:  02/06/20 240 lb (108.9 kg)   11/07/19 233 lb (105.7 kg)   08/02/19 233 lb (105.7 kg)      05/03/19 232 lb 9.6 oz (105.5 kg)      02/01/19 229 lb 6.4 oz (104.1 kg)      Phentermine 7/30/2018-10/30/2018  Weight 229 lbs 2/1/2019  Weight 237 lbs 7/30/2018. Weight 231 lbs 8/29/2018  Weight 230 lbs 12.8 oz on 9/25/2018  Weight 230 lbs on 10/31/2018     8. Acquired hypothyroidism  TSH 0.9-1.3-2.1-3.3-2.3-2.3-2.77-2.58-2.36  Continue Synthroid 0.075 mg qd  - T4, Free; Future  - TSH without Reflex; Future     9. MCTD  Continue Rheumatology follow up. 10.  Microalbuminuria  Microalbumin creatinine ratio is fluctuating  44-15-12-53-40-21   On lisinopril     Reviewed and/or ordered clinical lab results Yes  Reviewed and/or ordered radiology tests Yes  Reviewed and/or ordered other diagnostic tests No  Discussed test results with performing physician No  Independently reviewed image, tracing, or specimen Yes Insulin pump data, settings, tracing, total 7 pages.  See scanned document  Made a decision to obtain old records No  Reviewed old records Yes  Obtained history from other than patient No    Pedro Catalan was counseled regarding symptoms of diabetes diagnosis, hypothyroidism course and complications of disease if inadequately treated, side effects of medications, diagnosis, treatment options, and prognosis, risks, benefits, complications, and alternatives of treatment, labs, imaging and other studies and treatment targets and goals, insulin pump records, settings, Omnipod  She understands instructions and counseling    Total time I spent for this encounter 40 minutes    Return in about 4 months (around 6/20/2023) for diabetes.

## 2023-05-08 ENCOUNTER — TELEPHONE (OUTPATIENT)
Dept: ENDOCRINOLOGY | Age: 63
End: 2023-05-08

## 2023-05-08 NOTE — TELEPHONE ENCOUNTER
I received the request regarding pump during surgery. Will discuss with patient. Left patient a message that I will call her back. l

## 2023-05-08 NOTE — TELEPHONE ENCOUNTER
I spoke with patient. Tomorrow she will be on clear liquids only. Advised to decrease basal rate 0.8 units/h and use correction doses if necessary. We will resume current settings after the surgery. I dictated the settings to patient. Filled the form regarding pump and surgery. Please fax as requested.

## 2023-06-21 DIAGNOSIS — E03.9 ACQUIRED HYPOTHYROIDISM: ICD-10-CM

## 2023-06-21 DIAGNOSIS — E06.3 HASHIMOTO'S THYROIDITIS: ICD-10-CM

## 2023-06-21 RX ORDER — LEVOTHYROXINE SODIUM 75 MCG
75 TABLET ORAL
Qty: 90 TABLET | Refills: 0 | Status: SHIPPED | OUTPATIENT
Start: 2023-06-21

## 2023-06-27 ENCOUNTER — OFFICE VISIT (OUTPATIENT)
Dept: ENDOCRINOLOGY | Age: 63
End: 2023-06-27
Payer: OTHER GOVERNMENT

## 2023-06-27 VITALS
SYSTOLIC BLOOD PRESSURE: 139 MMHG | BODY MASS INDEX: 34.4 KG/M2 | WEIGHT: 227 LBS | OXYGEN SATURATION: 100 % | DIASTOLIC BLOOD PRESSURE: 93 MMHG | HEIGHT: 68 IN | RESPIRATION RATE: 14 BRPM | HEART RATE: 100 BPM | TEMPERATURE: 98 F

## 2023-06-27 DIAGNOSIS — M35.1 MCTD (MIXED CONNECTIVE TISSUE DISEASE) (HCC): ICD-10-CM

## 2023-06-27 DIAGNOSIS — R80.9 MICROALBUMINURIA: ICD-10-CM

## 2023-06-27 DIAGNOSIS — I10 ESSENTIAL HYPERTENSION: ICD-10-CM

## 2023-06-27 DIAGNOSIS — E11.40 TYPE 2 DIABETES, CONTROLLED, WITH NEUROPATHY (HCC): Primary | ICD-10-CM

## 2023-06-27 DIAGNOSIS — E06.3 HASHIMOTO'S THYROIDITIS: ICD-10-CM

## 2023-06-27 DIAGNOSIS — E78.2 MIXED HYPERLIPIDEMIA: ICD-10-CM

## 2023-06-27 DIAGNOSIS — E66.9 CLASS 1 OBESITY WITH BODY MASS INDEX (BMI) OF 34.0 TO 34.9 IN ADULT, UNSPECIFIED OBESITY TYPE, UNSPECIFIED WHETHER SERIOUS COMORBIDITY PRESENT: ICD-10-CM

## 2023-06-27 DIAGNOSIS — E04.9 GOITER: ICD-10-CM

## 2023-06-27 DIAGNOSIS — E55.9 VITAMIN D DEFICIENCY: ICD-10-CM

## 2023-06-27 DIAGNOSIS — E03.9 ACQUIRED HYPOTHYROIDISM: ICD-10-CM

## 2023-06-27 PROBLEM — E66.812 CLASS 2 SEVERE OBESITY WITH SERIOUS COMORBIDITY AND BODY MASS INDEX (BMI) OF 35.0 TO 35.9 IN ADULT: Status: ACTIVE | Noted: 2023-06-27

## 2023-06-27 PROBLEM — E66.01 CLASS 2 SEVERE OBESITY WITH SERIOUS COMORBIDITY AND BODY MASS INDEX (BMI) OF 35.0 TO 35.9 IN ADULT (HCC): Status: ACTIVE | Noted: 2023-06-27

## 2023-06-27 PROBLEM — E66.811 CLASS 1 OBESITY WITH BODY MASS INDEX (BMI) OF 34.0 TO 34.9 IN ADULT: Status: ACTIVE | Noted: 2023-06-27

## 2023-06-27 PROCEDURE — 3044F HG A1C LEVEL LT 7.0%: CPT | Performed by: INTERNAL MEDICINE

## 2023-06-27 PROCEDURE — 3075F SYST BP GE 130 - 139MM HG: CPT | Performed by: INTERNAL MEDICINE

## 2023-06-27 PROCEDURE — 99215 OFFICE O/P EST HI 40 MIN: CPT | Performed by: INTERNAL MEDICINE

## 2023-06-27 PROCEDURE — 3080F DIAST BP >= 90 MM HG: CPT | Performed by: INTERNAL MEDICINE

## 2023-09-20 DIAGNOSIS — E11.40 TYPE 2 DIABETES, CONTROLLED, WITH NEUROPATHY (HCC): ICD-10-CM

## 2023-09-20 RX ORDER — INSULIN LISPRO 100 [IU]/ML
INJECTION, SOLUTION INTRAVENOUS; SUBCUTANEOUS
Qty: 150 ML | Refills: 1 | Status: SHIPPED | OUTPATIENT
Start: 2023-09-20

## 2023-10-23 ENCOUNTER — PATIENT MESSAGE (OUTPATIENT)
Dept: ENDOCRINOLOGY | Age: 63
End: 2023-10-23

## 2023-10-24 NOTE — TELEPHONE ENCOUNTER
From: Shiva Farr  To: Dr. Noelle Trevizo: 10/23/2023 4:35 PM EDT  Subject: Out of control blood sugars    Hi, Dr Cady Alfaro     I have been experiencing very high blood sugars for the past five days. I haven't changed my eating habits and still exercise 3 to 5 times a day. If you can think of anything to help me get my blood sugar back to normal let me know when it's could let me know when it's convenient for you.      Thank you

## 2023-11-06 DIAGNOSIS — E03.9 ACQUIRED HYPOTHYROIDISM: ICD-10-CM

## 2023-11-06 DIAGNOSIS — E06.3 HASHIMOTO'S THYROIDITIS: ICD-10-CM

## 2023-11-06 RX ORDER — LEVOTHYROXINE SODIUM 75 MCG
75 TABLET ORAL
Qty: 90 TABLET | Refills: 1 | Status: SHIPPED | OUTPATIENT
Start: 2023-11-06

## 2023-11-06 NOTE — TELEPHONE ENCOUNTER
I spoke with patient. She still has some lymph node swelling. One side improved, but the other has not improved. Blood glucose levels improved. Advised patient to contact PCP for ENT referral ASAP.

## 2023-11-06 NOTE — TELEPHONE ENCOUNTER
I apologize for not getting back to you about my out of control blood sugars and it turns out to be a viral infection which caused my glands to become inflamed. My family doctor suggested I go to an on demand clinic so I could be seen right away. I'm on an antibiotic right now and was told if that doesn't clear up my problem I will be reevaluated asap.

## 2024-01-04 ENCOUNTER — OFFICE VISIT (OUTPATIENT)
Dept: ENDOCRINOLOGY | Age: 64
End: 2024-01-04
Payer: OTHER GOVERNMENT

## 2024-01-04 VITALS
HEIGHT: 68 IN | RESPIRATION RATE: 14 BRPM | OXYGEN SATURATION: 99 % | BODY MASS INDEX: 32.13 KG/M2 | DIASTOLIC BLOOD PRESSURE: 79 MMHG | TEMPERATURE: 98 F | SYSTOLIC BLOOD PRESSURE: 157 MMHG | WEIGHT: 212 LBS | HEART RATE: 83 BPM

## 2024-01-04 DIAGNOSIS — E66.9 CLASS 1 OBESITY WITH BODY MASS INDEX (BMI) OF 32.0 TO 32.9 IN ADULT, UNSPECIFIED OBESITY TYPE, UNSPECIFIED WHETHER SERIOUS COMORBIDITY PRESENT: ICD-10-CM

## 2024-01-04 DIAGNOSIS — I10 ESSENTIAL HYPERTENSION: ICD-10-CM

## 2024-01-04 DIAGNOSIS — R80.9 MICROALBUMINURIA: ICD-10-CM

## 2024-01-04 DIAGNOSIS — E55.9 VITAMIN D DEFICIENCY: ICD-10-CM

## 2024-01-04 DIAGNOSIS — E06.3 HASHIMOTO'S THYROIDITIS: ICD-10-CM

## 2024-01-04 DIAGNOSIS — R79.89 ELEVATED LIVER FUNCTION TESTS: ICD-10-CM

## 2024-01-04 DIAGNOSIS — E03.9 ACQUIRED HYPOTHYROIDISM: ICD-10-CM

## 2024-01-04 DIAGNOSIS — M35.1 MCTD (MIXED CONNECTIVE TISSUE DISEASE) (HCC): ICD-10-CM

## 2024-01-04 DIAGNOSIS — E78.2 MIXED HYPERLIPIDEMIA: ICD-10-CM

## 2024-01-04 DIAGNOSIS — E04.9 GOITER: ICD-10-CM

## 2024-01-04 DIAGNOSIS — E11.40 TYPE 2 DIABETES, CONTROLLED, WITH NEUROPATHY (HCC): Primary | ICD-10-CM

## 2024-01-04 PROCEDURE — 99215 OFFICE O/P EST HI 40 MIN: CPT | Performed by: INTERNAL MEDICINE

## 2024-01-04 PROCEDURE — 3077F SYST BP >= 140 MM HG: CPT | Performed by: INTERNAL MEDICINE

## 2024-01-04 PROCEDURE — 3078F DIAST BP <80 MM HG: CPT | Performed by: INTERNAL MEDICINE

## 2024-01-04 RX ORDER — INSULIN PMP CART,AUT,G6/7,CNTR
EACH SUBCUTANEOUS
Qty: 1 KIT | Refills: 0 | Status: SHIPPED | OUTPATIENT
Start: 2024-01-04

## 2024-01-04 RX ORDER — PROCHLORPERAZINE 25 MG/1
SUPPOSITORY RECTAL
Qty: 9 EACH | Refills: 3 | Status: SHIPPED | OUTPATIENT
Start: 2024-01-04

## 2024-01-04 RX ORDER — LISINOPRIL 30 MG/1
30 TABLET ORAL DAILY
COMMUNITY
Start: 2023-11-21

## 2024-01-04 RX ORDER — PROCHLORPERAZINE 25 MG/1
SUPPOSITORY RECTAL
Qty: 1 EACH | Refills: 3 | Status: SHIPPED | OUTPATIENT
Start: 2024-01-04

## 2024-01-04 RX ORDER — INSULIN PMP CART,AUT,G6/7,CNTR
EACH SUBCUTANEOUS
Qty: 30 EACH | Refills: 1 | Status: SHIPPED | OUTPATIENT
Start: 2024-01-04

## 2024-01-04 RX ORDER — PROCHLORPERAZINE 25 MG/1
SUPPOSITORY RECTAL
Qty: 1 EACH | Refills: 0 | Status: SHIPPED | OUTPATIENT
Start: 2024-01-04

## 2024-01-04 NOTE — PROGRESS NOTES
round, reactive to light  Ears/Nose: external inspection of ears and nose revealed no abnormalities, hearing is grossly normal  Oropharynx/Mouth/Face: lips, tongue and gums are normal with no lesions, the voice quality was normal  Neck: neck is supple and symmetric, with midline trachea and no masses, thyroid is normal  Lymphatics: normal cervical lymph nodes, normal supraclavicular nodes  Pulmonary: no increased work of breathing or signs of respiratory distress, lungs are clear to auscultation  Cardiovascular: normal heart rate and rhythm, normal S1 and S2, no murmurs and pedal pulses and 2+ bilaterally, No edema  Abdomen: abdomen is soft, non-tender with no masses  Musculoskeletal: normal gait and station and exam of the digits and nails are normal  Neurological: normal coordination and normal general cortical function    Visual inspection:  Deformity/amputation: has hammer toes, no amputation  Skin lesions/pre-ulcerative calluses: absent  Edema: right- negative, left- negative     Sensory exam:  Monofilament sensation: normal  (minimum of 5 random plantar locations tested, avoiding callused areas - > 1 area with absence of sensation is + for neuropathy)     Plus at least one of the following:  Pulses: normal   Proprioception: Intact  Vibration (128 Hz): Impaired     ASSESSMENT/PLAN:    1. Type 2 diabetes, controlled, with neuropathy (HCC)  Prefers Omnipod5/Dexcom.  Reviewed pump data.  Average blood glucose 129±253  Bolus amount 49%, basal amount 51%.  Average carbohydrates 270±62.  Blood glucose readings above target 6%, below target 1, in range 93.  Recommend to continue same rates and ratios.  Continue watching diet, doing timely bolus injections.  HbA1c 6.1-5.8-5.7-5.8-6.7-6.6-6.3-6.6-5.9-6.2-6.7-7.4-6.5-5.9-6.3  Stopped Trulicity due to side effects, diarrhea  Test 10 times daily.  - Hemoglobin A1C; Future  - Comprehensive Metabolic Panel; Future     2. Mixed hyperlipidemia  LDL

## 2024-01-05 ENCOUNTER — TELEPHONE (OUTPATIENT)
Dept: ENDOCRINOLOGY | Age: 64
End: 2024-01-05

## 2024-01-05 NOTE — TELEPHONE ENCOUNTER
Submitted PA for Dexcom Sensor  Via [facility] Key: [id number]   STATUS: Please advise the pharmacy to first submit request to members primary plan.

## 2024-01-24 ENCOUNTER — TELEPHONE (OUTPATIENT)
Dept: ENDOCRINOLOGY | Age: 64
End: 2024-01-24

## 2024-01-24 NOTE — TELEPHONE ENCOUNTER
Submitted PA for Omnipod Pods  Via [facility] Key: [id number]   STATUS: Please advise the pharmacy to first submit request to members primary plan.    I would need to know the pt's primary plan to be able to submit the PA. If pt only has the  plan, then she will need to contact  and let them know she does not have another plan. Thanks!

## 2024-01-25 NOTE — TELEPHONE ENCOUNTER
Pt called back, gave pt verbatim message. Pt stated that she only has  [employer]     Pt stated that she has spoken with her insurance they stated that they only need to know the reason for the Omnipods

## 2024-01-29 NOTE — TELEPHONE ENCOUNTER
Submitted PA for Omnipod Pods  Via [facility] Key: [id number]   STATUS: Please advise the pharmacy to first submit request to members primary plan    This is still the message I am receiving when I submit the PA. [employer] 's system still thinks the pt has a different primary insurance. The pt will have to call [employer] to have them fix it in their system. Thanks!

## 2024-01-29 NOTE — TELEPHONE ENCOUNTER
Pt calling back and was given message    Pt states Maira is asking for a letter from the 's office stating why pt needs Omnipod    # [phone]

## 2024-01-29 NOTE — TELEPHONE ENCOUNTER
Spoke w/ [facility] pharmacy, they are remaining by that  requires a PA. They state that they can't do anything else, that doctors office has to do PA. Next step is to have pt call her insurance and follow erwin's suggestion of informing them what is going on & make sure they are aware they are her ONLY insurance.    LVM to return call

## 2024-02-02 NOTE — TELEPHONE ENCOUNTER
Submitted PA for Omnipod Pods  Via CMM Key: [id number]   STATUS: Please advise the pharmacy to first submit request to members primary plan    I am still getting this message. As I was looking in media, I see at one time there was an [employer] plan listed as primary and  was secondary. Maybe Maira still thinks [employer] is primary? Please advise. Thanks!

## 2024-02-02 NOTE — TELEPHONE ENCOUNTER
Spoke w/ pt's insurance, they state that she has another insurance. They recommend to call her old insurance and then call  afterwards.    Informed pt, she will call her old insurance

## 2024-02-02 NOTE — TELEPHONE ENCOUNTER
Call from pt's spouse Evelio stating that they contacted their old insurance and got every thing straightened out     Evelio stated that they reached back out to their current insurance  regarding the PA for the Omnipod and were told that have no received a PA request from our office     Evelio is wanting to know if a PA could be resubmitted?    Please advise

## 2024-02-02 NOTE — TELEPHONE ENCOUNTER
Submitted PA for Omnipod Pods  Via [facility] Key: [id number] STATUS: PENDING.    Follow up done daily; if no decision with in three days we will refax.  If another three days goes by with no decision will call the insurance for status.

## 2024-02-05 ENCOUNTER — TELEPHONE (OUTPATIENT)
Dept: ENDOCRINOLOGY | Age: 64
End: 2024-02-05

## 2024-02-05 NOTE — TELEPHONE ENCOUNTER
Submitted PA for [employer] Intro Kit  Via [facility] Key: [id number] STATUS: PENDING.    Follow up done daily; if no decision with in three days we will refax.  If another three days goes by with no decision will call the insurance for status.

## 2024-03-10 DIAGNOSIS — E06.3 HASHIMOTO'S THYROIDITIS: ICD-10-CM

## 2024-03-10 DIAGNOSIS — E03.9 ACQUIRED HYPOTHYROIDISM: ICD-10-CM

## 2024-03-11 RX ORDER — LEVOTHYROXINE SODIUM 75 MCG
75 TABLET ORAL
Qty: 90 TABLET | Refills: 1 | Status: SHIPPED | OUTPATIENT
Start: 2024-03-11

## 2024-04-05 ENCOUNTER — PATIENT MESSAGE (OUTPATIENT)
Dept: ENDOCRINOLOGY | Age: 64
End: 2024-04-05

## 2024-04-08 RX ORDER — BLOOD SUGAR DIAGNOSTIC
1 STRIP MISCELLANEOUS DAILY
Qty: 900 EACH | Refills: 2 | Status: SHIPPED | OUTPATIENT
Start: 2024-04-08

## 2024-04-08 RX ORDER — BLOOD SUGAR DIAGNOSTIC
1 STRIP MISCELLANEOUS DAILY
COMMUNITY
End: 2024-04-08 | Stop reason: SDUPTHER

## 2024-04-08 NOTE — TELEPHONE ENCOUNTER
From: Simona Manriquez  To: Dr. Maddie Correa  Sent: 4/5/2024 2:44 PM EDT  Subject: Test strips     Hi Dr. Correa,    Could please send a prescription acc-chek guide test strips to the [facility] pharmacy when you have a chance at your earliest convenience.    Thank you so much  Simona

## 2024-06-12 PROBLEM — E66.811 CLASS 1 OBESITY WITH BODY MASS INDEX (BMI) OF 32.0 TO 32.9 IN ADULT: Status: ACTIVE | Noted: 2024-06-12

## 2024-06-12 PROBLEM — E66.9 CLASS 1 OBESITY WITH BODY MASS INDEX (BMI) OF 32.0 TO 32.9 IN ADULT: Status: ACTIVE | Noted: 2024-06-12

## 2024-06-13 ENCOUNTER — TELEPHONE (OUTPATIENT)
Dept: ENDOCRINOLOGY | Age: 64
End: 2024-06-13

## 2024-06-13 ENCOUNTER — TELEMEDICINE (OUTPATIENT)
Dept: ENDOCRINOLOGY | Age: 64
End: 2024-06-13

## 2024-06-13 DIAGNOSIS — E06.3 HASHIMOTO'S THYROIDITIS: ICD-10-CM

## 2024-06-13 DIAGNOSIS — M35.1 MCTD (MIXED CONNECTIVE TISSUE DISEASE) (HCC): ICD-10-CM

## 2024-06-13 DIAGNOSIS — E78.2 MIXED HYPERLIPIDEMIA: ICD-10-CM

## 2024-06-13 DIAGNOSIS — E03.9 ACQUIRED HYPOTHYROIDISM: ICD-10-CM

## 2024-06-13 DIAGNOSIS — E66.9 CLASS 1 OBESITY WITH BODY MASS INDEX (BMI) OF 32.0 TO 32.9 IN ADULT, UNSPECIFIED OBESITY TYPE, UNSPECIFIED WHETHER SERIOUS COMORBIDITY PRESENT: ICD-10-CM

## 2024-06-13 DIAGNOSIS — Z91.89 AT HIGH RISK FOR CARDIOVASCULAR DISEASE: ICD-10-CM

## 2024-06-13 DIAGNOSIS — R79.89 ELEVATED LIVER FUNCTION TESTS: ICD-10-CM

## 2024-06-13 DIAGNOSIS — R80.9 MICROALBUMINURIA: ICD-10-CM

## 2024-06-13 DIAGNOSIS — I10 ESSENTIAL HYPERTENSION: ICD-10-CM

## 2024-06-13 DIAGNOSIS — E04.9 GOITER: ICD-10-CM

## 2024-06-13 DIAGNOSIS — E11.40 TYPE 2 DIABETES, CONTROLLED, WITH NEUROPATHY (HCC): Primary | ICD-10-CM

## 2024-06-13 DIAGNOSIS — E55.9 VITAMIN D DEFICIENCY: ICD-10-CM

## 2024-06-13 RX ORDER — MULTIVIT-MIN/IRON/FOLIC ACID/K 18-600-40
CAPSULE ORAL
Qty: 30 CAPSULE | Refills: 0
Start: 2024-06-13

## 2024-06-13 RX ORDER — HYDROXYCHLOROQUINE SULFATE 200 MG/1
200 TABLET, FILM COATED ORAL DAILY
COMMUNITY

## 2024-06-13 RX ORDER — MELOXICAM 15 MG/1
15 TABLET ORAL DAILY
COMMUNITY
Start: 2024-05-28 | End: 2025-05-28

## 2024-06-13 RX ORDER — BLOOD-GLUCOSE SENSOR
EACH MISCELLANEOUS
Qty: 12 EACH | Refills: 3 | Status: SHIPPED | OUTPATIENT
Start: 2024-06-13

## 2024-06-13 RX ORDER — INSULIN LISPRO 100 [IU]/ML
INJECTION, SOLUTION INTRAVENOUS; SUBCUTANEOUS
Qty: 150 ML | Refills: 1 | Status: SHIPPED | OUTPATIENT
Start: 2024-06-13

## 2024-06-13 RX ORDER — INSULIN PMP CART,AUT,G6/7,CNTR
EACH SUBCUTANEOUS
Qty: 30 EACH | Refills: 1 | Status: CANCELLED | OUTPATIENT
Start: 2024-06-13

## 2024-06-13 RX ORDER — ROSUVASTATIN CALCIUM 20 MG/1
20 TABLET, COATED ORAL DAILY
Qty: 90 TABLET | Refills: 1 | Status: SHIPPED | OUTPATIENT
Start: 2024-06-13

## 2024-06-13 RX ORDER — SERTRALINE HYDROCHLORIDE 25 MG/1
25 TABLET, FILM COATED ORAL DAILY
COMMUNITY
Start: 2024-06-04 | End: 2025-06-04

## 2024-06-13 NOTE — PROGRESS NOTES
Simona Manriquez is a 64 y.o. female who presents virtually for Type 2 diabetes mellitus.    Current symptoms/problems include  leg weakness  and are unchanged.     1.  Type 2 diabetes, controlled, with neuropathy (HCC) [E11.40]     Diagnosed with Type 2 diabetes mellitus in 2004.     Comorbid conditions: Neuropathy  Previously Medtronic MiniMed insulin pump 630G  Omnipod was not covered by insurance.  Currently on Medtronic Minimed/Guardian    Current diabetic medications include: Humalog     Intolerance to diabetes medications: Yes Metformin     Patient is having a lot of stress.  Despite that, she manages her diabetes very well.  She got severe muscle weakness in her legs  Difficult to walk, worse  Stopped working     Weight trend: stable  Prior visit with dietician: yes  Current diet: on average, 3 meals per day  Current exercise: frequent     Current monitoring regimen: home blood tests - 10 times daily  Has brought blood glucose log/meter:  Yes  Home blood sugar records: fasting range:  and postprandial range:   Any episodes of hypoglycemia? Yes  Hypoglycemia frequency and time(s):  2 times a week  Does patient have Glucagon emergency kit? No  Does patient have rapid acting carbohydrate?  Yes  Does patient wear a medic alert bracelet or necklace?  No     2. Mixed hyperlipidemia  Has muscle pain.  Had problems with lipitor and crestor.     3. Hashimoto's thyroiditis  Has fatigue.     4. Goiter  No difficulty swallowing.     5. Vitamin D deficiency  Has fatigue.     6. Essential hypertension  No headaches.     7. Obesity  Eats healthy.   Tried low carb diet, eats healthy.     8. Hypothyroidism  Has fatigue     9. MCTD  Sees Rheumatologist  Has point pain, joint swelling, fatigue     10.  Microalbuminuria  No urination problems     11. Elevated liver function tests  Has dyspepsia    12. At high risk for cardiovascular disease  No chest pain, SOB      Narrative   THYROID ULTRASOUND, 3/4/2021 10:37 AM

## 2024-06-19 LAB
A/G RATIO: 2 RATIO (ref 0.8–2.6)
ALBUMIN: 4 G/DL (ref 3.5–5.2)
ALP BLD-CCNC: 74 U/L (ref 23–144)
ALT SERPL-CCNC: 43 U/L (ref 0–60)
AST SERPL-CCNC: 47 U/L (ref 0–55)
BILIRUB SERPL-MCNC: 0.6 MG/DL (ref 0–1.2)
BUN / CREAT RATIO: 16 (ref 7–25)
BUN BLDV-MCNC: 11 MG/DL (ref 3–29)
CALCIUM SERPL-MCNC: 9.4 MG/DL (ref 8.5–10.5)
CHLORIDE BLD-SCNC: 103 MEQ/L (ref 96–110)
CHOLESTEROL, TOTAL: 144 MG/DL
CO2: 26 MEQ/L (ref 19–32)
CREAT SERPL-MCNC: 0.7 MG/DL (ref 0.5–1.2)
CREATININE URINE: 134.3 MG/DL
ESTIMATED GLOMERULAR FILTRATION RATE CREATININE EQUATION: 97 MLS/MIN/1.73M2
FASTING STATUS: ABNORMAL
GLOBULIN: 2 G/DL (ref 1.9–3.6)
GLUCOSE BLD-MCNC: 106 MG/DL (ref 70–99)
HBA1C MFR BLD: 6.3 % (ref 4–6)
HDLC SERPL-MCNC: 78 MG/DL
LDL CHOLESTEROL: 51 MG/DL
MICROALBUMIN/CREAT 24H UR: ABNORMAL MCG/DL
MICROALBUMIN/CREAT UR-RTO: 87 MCG/MG CREAT.
POTASSIUM SERPL-SCNC: 3.6 MEQ/L (ref 3.4–5.3)
SODIUM BLD-SCNC: 139 MEQ/L (ref 135–148)
T4 FREE: 1.35 NG/DL (ref 0.8–1.8)
TOTAL PROTEIN: 6 G/DL (ref 6–8.3)
TRIGL SERPL-MCNC: 74 MG/DL
TSH ULTRASENSITIVE: 4.18 MCIU/ML (ref 0.4–4.5)
VITAMIN D 25-HYDROXY: 39 NG/ML (ref 30–100)
VLDLC SERPL CALC-MCNC: 15 MG/DL (ref 4–38)

## 2024-06-20 ENCOUNTER — TELEPHONE (OUTPATIENT)
Dept: ENDOCRINOLOGY | Age: 64
End: 2024-06-20

## 2024-06-22 NOTE — TELEPHONE ENCOUNTER
Please inform patient:  Hemoglobin A1c was 6.3, very good.  Cholesterol, metabolic panel, vitamin D are good.  TSH is slightly different, is she taking Synthroid as prescribed without skipping?  If she feeling more tired?  If there is no reason why the test is different and she feels the same, I advise  to keep the same Synthroid dose.  If next appointment test remains borderline, I recommend to increase the dose.  If she feels very tired, then we can increase that dose now.  Let me know.  Protein is slightly elevated taken, continue lisinopril.  Kidney function on chemistry panel improved and is normal.  Also, there is improvement of liver function test, which is normal now.

## 2024-06-24 NOTE — TELEPHONE ENCOUNTER
Called and informed pt, pt expressed understanding.    Pt has not skipped any doses and denies any sx. Pt will keep current dose and recheck before next appt.

## 2024-07-09 ENCOUNTER — TELEPHONE (OUTPATIENT)
Dept: ENDOCRINOLOGY | Age: 64
End: 2024-07-09

## 2024-07-10 DIAGNOSIS — E06.3 HASHIMOTO'S THYROIDITIS: ICD-10-CM

## 2024-07-10 DIAGNOSIS — E03.9 ACQUIRED HYPOTHYROIDISM: ICD-10-CM

## 2024-07-10 RX ORDER — LEVOTHYROXINE SODIUM 75 MCG
75 TABLET ORAL
Qty: 90 TABLET | Refills: 1 | Status: SHIPPED | OUTPATIENT
Start: 2024-07-10

## 2024-07-10 NOTE — TELEPHONE ENCOUNTER
I need to review patient's Medtronic MiniMed 780/CGM download.  It was not available during appointment.  I understand that it is an staying awake procedure.  Verify if patient is not taking aspirin or other blood thinner.  If she does, then she needs to contact family and the doctor for recommendations regarding blood thinners.    After I reviewed the download, I will discuss with patient how to handle the pump before and during procedure.

## 2024-07-10 NOTE — TELEPHONE ENCOUNTER
Form printed, pt is wanting to undergo sonobello tx, they are requesting medical clearance. Please advise.

## 2024-07-11 NOTE — TELEPHONE ENCOUNTER
LVM to return call    Pt can either come in for nurses appt for pump download or contact [employer] to get there pump synced w/ the 780 sandra. OK to give medtronic reps # as well if easier for pt.

## 2024-07-15 ENCOUNTER — LAB (OUTPATIENT)
Dept: ENDOCRINOLOGY | Age: 64
End: 2024-07-15

## 2024-07-15 ENCOUNTER — TELEPHONE (OUTPATIENT)
Dept: ENDOCRINOLOGY | Age: 64
End: 2024-07-15

## 2024-07-17 ENCOUNTER — TELEPHONE (OUTPATIENT)
Dept: ENDOCRINOLOGY | Age: 64
End: 2024-07-17

## 2024-07-17 NOTE — TELEPHONE ENCOUNTER
Dr Bustamante office called needing a letter of clearance for pt asking to please send by the end of the week pt is having surgery next week      [phone]

## 2024-07-18 DIAGNOSIS — E03.9 ACQUIRED HYPOTHYROIDISM: ICD-10-CM

## 2024-07-18 DIAGNOSIS — E06.3 HASHIMOTO'S THYROIDITIS: ICD-10-CM

## 2024-07-18 RX ORDER — LEVOTHYROXINE SODIUM 75 MCG
75 TABLET ORAL
Qty: 90 TABLET | Refills: 1 | OUTPATIENT
Start: 2024-07-18

## 2024-10-06 ENCOUNTER — TELEPHONE (OUTPATIENT)
Dept: ENDOCRINOLOGY | Age: 64
End: 2024-10-06

## 2024-10-06 RX ORDER — ROSUVASTATIN CALCIUM 20 MG/1
20 TABLET, COATED ORAL DAILY
Qty: 90 TABLET | Refills: 1 | Status: SHIPPED | OUTPATIENT
Start: 2024-10-06

## 2024-10-18 LAB
A/G RATIO: 1.7 RATIO (ref 0.8–2.6)
ALBUMIN: 4.2 G/DL (ref 3.5–5.2)
ALP BLD-CCNC: 113 U/L (ref 23–144)
ALT SERPL-CCNC: 127 U/L (ref 0–60)
AST SERPL-CCNC: 152 U/L (ref 0–55)
BILIRUB SERPL-MCNC: 0.4 MG/DL (ref 0–1.2)
BUN / CREAT RATIO: 19 (ref 7–25)
BUN BLDV-MCNC: 15 MG/DL (ref 3–29)
CALCIUM SERPL-MCNC: 9.5 MG/DL (ref 8.5–10.5)
CHLORIDE BLD-SCNC: 96 MEQ/L (ref 96–110)
CHOLESTEROL, TOTAL: 119 MG/DL
CO2: 23 MEQ/L (ref 19–32)
CREAT SERPL-MCNC: 0.8 MG/DL (ref 0.5–1.2)
CREATININE URINE: 53.5 MG/DL
ESTIMATED GLOMERULAR FILTRATION RATE CREATININE EQUATION: 83 MLS/MIN/1.73M2
FASTING STATUS: ABNORMAL
GLOBULIN: 2.5 G/DL (ref 1.9–3.6)
GLUCOSE BLD-MCNC: 103 MG/DL (ref 70–99)
HBA1C MFR BLD: 5.9 %
HDLC SERPL-MCNC: 67 MG/DL
LDL CHOLESTEROL: 35 MG/DL
MICROALBUMIN/CREAT 24H UR: 4340 MCG/DL
MICROALBUMIN/CREAT UR-RTO: 81 MCG/MG CREAT.
POTASSIUM SERPL-SCNC: 3.9 MEQ/L (ref 3.4–5.3)
SODIUM BLD-SCNC: 131 MEQ/L (ref 135–148)
T4 FREE: 1.35 NG/DL (ref 0.8–1.8)
TOTAL PROTEIN: 6.7 G/DL (ref 6–8.3)
TRIGL SERPL-MCNC: 85 MG/DL
TSH ULTRASENSITIVE: 3.82 MCIU/ML (ref 0.4–4.5)
VITAMIN D 25-HYDROXY: 41 NG/ML (ref 30–100)
VLDLC SERPL CALC-MCNC: 17 MG/DL (ref 4–38)

## 2024-10-20 ENCOUNTER — TELEPHONE (OUTPATIENT)
Dept: ENDOCRINOLOGY | Age: 64
End: 2024-10-20

## 2024-10-21 NOTE — TELEPHONE ENCOUNTER
Please inform patient that her liver function tests are very high.  Is she taking any new medications?  Any Tylenol?  Any nausea, vomiting, abdominal pain?  Please stop taking both cholesterol medications until further evaluation.  We can discuss cholesterol medications during appointment.  Please contact PCP and/or GI if she sees gastroenterology.  If she sees gastroenterology, let us know so we can forward results.  If not, contact PCP ASAP.  I forwarded her lab result, but she still needs to call.

## 2024-10-22 NOTE — TELEPHONE ENCOUNTER
Spoke w/ pt. She is not currently on any new medications. She does c/o nausea, but it is chronic. She is on 40mg Omeprazole for it, not new dx per pt.    She will stop the cholesterol medication til directed otherwise    She does have a GastroEnterologist - Dr Santos. Forwarded CMP to her & pt will contact her.

## 2024-10-23 ENCOUNTER — TELEPHONE (OUTPATIENT)
Dept: ENDOCRINOLOGY | Age: 64
End: 2024-10-23

## 2024-10-23 LAB — LIPOPROTEIN (A): 245 NMOL/L

## 2024-10-24 ENCOUNTER — TELEPHONE (OUTPATIENT)
Dept: ENDOCRINOLOGY | Age: 64
End: 2024-10-24

## 2024-10-25 LAB
A/G RATIO: 1.7 RATIO (ref 0.8–2.6)
ALBUMIN: 4.2 G/DL (ref 3.5–5.2)
ALP BLD-CCNC: 113 U/L (ref 23–144)
ALT SERPL-CCNC: 127 U/L (ref 0–60)
AST SERPL-CCNC: 152 U/L (ref 0–55)
BILIRUB SERPL-MCNC: 0.4 MG/DL (ref 0–1.2)
BUN / CREAT RATIO: 19 (ref 7–25)
BUN BLDV-MCNC: 15 MG/DL (ref 3–29)
CALCIUM SERPL-MCNC: 9.5 MG/DL (ref 8.5–10.5)
CHLORIDE BLD-SCNC: 96 MEQ/L (ref 96–110)
CO2: 23 MEQ/L (ref 19–32)
CREAT SERPL-MCNC: 0.8 MG/DL (ref 0.5–1.2)
ESTIMATED GLOMERULAR FILTRATION RATE CREATININE EQUATION: 83 MLS/MIN/1.73M2
FASTING STATUS: ABNORMAL
GLOBULIN: 2.5 G/DL (ref 1.9–3.6)
GLUCOSE BLD-MCNC: 103 MG/DL (ref 70–99)
POTASSIUM SERPL-SCNC: 3.9 MEQ/L (ref 3.4–5.3)
SODIUM BLD-SCNC: 139 MEQ/L (ref 135–148)
TOTAL PROTEIN: 6.7 G/DL (ref 6–8.3)

## 2024-11-04 PROBLEM — E78.41 ELEVATED LIPOPROTEIN(A): Status: ACTIVE | Noted: 2024-11-04

## 2024-11-04 PROBLEM — R93.1 AGATSTON CORONARY ARTERY CALCIUM SCORE BETWEEN 200 AND 399: Status: ACTIVE | Noted: 2024-11-04

## 2024-11-05 ENCOUNTER — OFFICE VISIT (OUTPATIENT)
Dept: ENDOCRINOLOGY | Age: 64
End: 2024-11-05
Payer: OTHER GOVERNMENT

## 2024-11-05 VITALS
DIASTOLIC BLOOD PRESSURE: 97 MMHG | BODY MASS INDEX: 29.1 KG/M2 | HEART RATE: 102 BPM | WEIGHT: 192 LBS | HEIGHT: 68 IN | TEMPERATURE: 98 F | SYSTOLIC BLOOD PRESSURE: 180 MMHG | RESPIRATION RATE: 14 BRPM | OXYGEN SATURATION: 99 %

## 2024-11-05 DIAGNOSIS — E78.41 ELEVATED LIPOPROTEIN(A): ICD-10-CM

## 2024-11-05 DIAGNOSIS — R79.89 ELEVATED LIVER FUNCTION TESTS: ICD-10-CM

## 2024-11-05 DIAGNOSIS — M35.1 MCTD (MIXED CONNECTIVE TISSUE DISEASE) (HCC): ICD-10-CM

## 2024-11-05 DIAGNOSIS — E04.9 GOITER: ICD-10-CM

## 2024-11-05 DIAGNOSIS — I10 ESSENTIAL HYPERTENSION: ICD-10-CM

## 2024-11-05 DIAGNOSIS — E55.9 VITAMIN D DEFICIENCY: ICD-10-CM

## 2024-11-05 DIAGNOSIS — E11.21 DIABETIC NEPHROPATHY ASSOCIATED WITH TYPE 2 DIABETES MELLITUS (HCC): ICD-10-CM

## 2024-11-05 DIAGNOSIS — R93.1 AGATSTON CORONARY ARTERY CALCIUM SCORE BETWEEN 200 AND 399: ICD-10-CM

## 2024-11-05 DIAGNOSIS — E03.9 ACQUIRED HYPOTHYROIDISM: ICD-10-CM

## 2024-11-05 DIAGNOSIS — E78.2 MIXED HYPERLIPIDEMIA: ICD-10-CM

## 2024-11-05 DIAGNOSIS — E66.3 OVERWEIGHT (BMI 25.0-29.9): ICD-10-CM

## 2024-11-05 DIAGNOSIS — E06.3 HASHIMOTO'S THYROIDITIS: ICD-10-CM

## 2024-11-05 DIAGNOSIS — E11.40 TYPE 2 DIABETES, CONTROLLED, WITH NEUROPATHY (HCC): Primary | ICD-10-CM

## 2024-11-05 PROCEDURE — 3080F DIAST BP >= 90 MM HG: CPT | Performed by: INTERNAL MEDICINE

## 2024-11-05 PROCEDURE — 95251 CONT GLUC MNTR ANALYSIS I&R: CPT | Performed by: INTERNAL MEDICINE

## 2024-11-05 PROCEDURE — 99214 OFFICE O/P EST MOD 30 MIN: CPT | Performed by: INTERNAL MEDICINE

## 2024-11-05 PROCEDURE — 3077F SYST BP >= 140 MM HG: CPT | Performed by: INTERNAL MEDICINE

## 2024-11-05 PROCEDURE — 3044F HG A1C LEVEL LT 7.0%: CPT | Performed by: INTERNAL MEDICINE

## 2024-11-05 RX ORDER — LEVOTHYROXINE SODIUM 75 MCG
75 TABLET ORAL
Qty: 90 TABLET | Refills: 1 | Status: SHIPPED | OUTPATIENT
Start: 2024-11-05

## 2024-11-05 RX ORDER — OMEPRAZOLE 40 MG/1
40 CAPSULE, DELAYED RELEASE ORAL DAILY
COMMUNITY
Start: 2024-09-04

## 2024-11-05 RX ORDER — ROSUVASTATIN CALCIUM 20 MG/1
20 TABLET, COATED ORAL DAILY
Qty: 90 TABLET | Refills: 1 | Status: CANCELLED | OUTPATIENT
Start: 2024-11-05

## 2024-11-05 RX ORDER — INSULIN LISPRO 100 [IU]/ML
INJECTION, SOLUTION INTRAVENOUS; SUBCUTANEOUS
Qty: 150 ML | Refills: 1 | Status: SHIPPED | OUTPATIENT
Start: 2024-11-05

## 2024-11-05 NOTE — PROGRESS NOTES
increased work of breathing or signs of respiratory distress, lungs are clear to auscultation  Cardiovascular: normal heart rate and rhythm, normal S1 and S2, no murmurs and pedal pulses and 2+ bilaterally, No edema  Abdomen: abdomen is soft, non-tender with no masses  Musculoskeletal: normal gait and station and exam of the digits and nails are normal  Neurological: normal coordination and normal general cortical function    Visual inspection:  Deformity/amputation: has hammer toes, no amputation  Skin lesions/pre-ulcerative calluses: absent  Edema: right- negative, left- negative     Sensory exam:  Monofilament sensation: normal  (minimum of 5 random plantar locations tested, avoiding callused areas - > 1 area with absence of sensation is + for neuropathy)     Plus at least one of the following:  Pulses: normal   Proprioception: Intact  Vibration (128 Hz): Impaired     ASSESSMENT/PLAN:    1. Type 2 diabetes, controlled, with neuropathy (HCC)  Currently on Medtronic MiniMed 7 80G/Guardian 4  Humalog in insulin pump.  HbA1c 6.1-5.8-5.7-5.8-6.7-6.6-6.3-6.6-5.9-6.2-6.7-7.4-6.5-5.9-6.3-5.9  Stopped Trulicity due to side effects, diarrhea  Test 10 times daily.  - Hemoglobin A1C; Future  - Comprehensive Metabolic Panel; Future     2. Mixed hyperlipidemia  LDL [id number]  Simvastatin changed to rosuvastatin.  Patient developed elevated liver enzymes.  Unclear if related to rosuvastatin.  Stop rosuvastatin and check liver enzymes in 2 weeks.  Call for results.  Continue Zetia 10 mg daily.  - Lipid Panel; Future     3. Hashimoto's thyroiditis  Follow  TSH 2.1-3.3-2.3-1.4-2.7-2.58-2.21-2.48-1.38     4. Goiter  Thyroid sono  TSH 2.7-2.58-2.21-1.38-1.03     5. Vitamin D deficiency  25 hydroxy vitamin D [id number]1  Increase vitamin D 2000 IU daily  - Vitamin D 25 Hydroxy; Future     6. Essential hypertension  Patient developed hives and angioedema on

## 2024-11-26 ENCOUNTER — PATIENT MESSAGE (OUTPATIENT)
Dept: ENDOCRINOLOGY | Age: 64
End: 2024-11-26

## 2025-02-01 LAB
A/G RATIO: 2.1 RATIO (ref 0.8–2.6)
ALBUMIN: 4.4 G/DL (ref 3.5–5.2)
ALP BLD-CCNC: 101 U/L (ref 23–144)
ALT SERPL-CCNC: 51 U/L (ref 0–60)
AST SERPL-CCNC: 53 U/L (ref 0–55)
BILIRUB SERPL-MCNC: 0.5 MG/DL (ref 0–1.2)
BUN / CREAT RATIO: 20 (ref 7–25)
BUN BLDV-MCNC: 14 MG/DL (ref 3–29)
CALCIUM SERPL-MCNC: 9.2 MG/DL (ref 8.5–10.5)
CHLORIDE BLD-SCNC: 96 MEQ/L (ref 96–110)
CHOLESTEROL, TOTAL: 146 MG/DL
CO2: 31 MEQ/L (ref 19–32)
CREAT SERPL-MCNC: 0.7 MG/DL (ref 0.5–1.2)
CREATININE URINE: 48.6 MG/DL
ESTIMATED GLOMERULAR FILTRATION RATE CREATININE EQUATION: 97 MLS/MIN/1.73M2
FASTING STATUS: ABNORMAL
GLOBULIN: 2.1 G/DL (ref 1.9–3.6)
GLUCOSE BLD-MCNC: 114 MG/DL (ref 70–99)
HBA1C MFR BLD: 5.8 %
HDLC SERPL-MCNC: 93 MG/DL
LDL CHOLESTEROL: 41 MG/DL
MICROALBUMIN/CREAT 24H UR: 3290 MCG/DL
MICROALBUMIN/CREAT UR-RTO: 68 MCG/MG CREAT.
POTASSIUM SERPL-SCNC: 2.7 MEQ/L (ref 3.4–5.3)
SODIUM BLD-SCNC: 141 MEQ/L (ref 135–148)
T3 FREE: 2.9 PG/ML (ref 2.3–4.2)
T4 FREE: 1.43 NG/DL (ref 0.8–1.8)
TOTAL PROTEIN: 6.5 G/DL (ref 6–8.3)
TRIGL SERPL-MCNC: 59 MG/DL
TSH ULTRASENSITIVE: 1.61 MCIU/ML (ref 0.4–4.5)
VLDLC SERPL CALC-MCNC: 12 MG/DL (ref 4–38)

## 2025-02-03 ENCOUNTER — TELEPHONE (OUTPATIENT)
Dept: ENDOCRINOLOGY | Age: 65
End: 2025-02-03

## 2025-02-03 RX ORDER — POTASSIUM CHLORIDE 1500 MG/1
TABLET, EXTENDED RELEASE ORAL
Qty: 5 TABLET | Refills: 0 | Status: SHIPPED | OUTPATIENT
Start: 2025-02-03

## 2025-02-03 NOTE — TELEPHONE ENCOUNTER
Please review attached results. Pt had cx her appt due to changing jobs. She is not able to come in any sooner than end of march due to being on 90 day probation at her new job. Pt scheduled towards end of march

## 2025-02-03 NOTE — TELEPHONE ENCOUNTER
Discussed results with patient.  Microalbumin creatinine ratio remains elevated, will discuss Jardiance during appointment.  Patient has recent increase in her diuretics.  Potassium 2.7.  Advised to contact cardiology ASAP for recommendations and go to the ER if unable to reach cardiology.  Also sent prescription to pharmacy to take potassium tonight if there is delay in cardiology response and patient is not going to the ER.  She is not symptomatic with chest pain, shortness of breath, palpitations, dizziness.

## 2025-02-07 DIAGNOSIS — E06.3 HASHIMOTO'S THYROIDITIS: ICD-10-CM

## 2025-02-07 DIAGNOSIS — E03.9 ACQUIRED HYPOTHYROIDISM: ICD-10-CM

## 2025-02-10 RX ORDER — LEVOTHYROXINE SODIUM 75 MCG
75 TABLET ORAL
Qty: 90 TABLET | Refills: 0 | Status: SHIPPED | OUTPATIENT
Start: 2025-02-10

## 2025-03-25 PROBLEM — E11.21 DIABETIC NEPHROPATHY ASSOCIATED WITH TYPE 2 DIABETES MELLITUS: Status: ACTIVE | Noted: 2025-03-25

## 2025-04-08 ENCOUNTER — TELEPHONE (OUTPATIENT)
Dept: ENDOCRINOLOGY | Age: 65
End: 2025-04-08

## 2025-04-28 ENCOUNTER — TELEPHONE (OUTPATIENT)
Dept: ENDOCRINOLOGY | Age: 65
End: 2025-04-28

## 2025-04-28 DIAGNOSIS — I10 ESSENTIAL HYPERTENSION: ICD-10-CM

## 2025-04-28 DIAGNOSIS — E11.40 TYPE 2 DIABETES, CONTROLLED, WITH NEUROPATHY (HCC): ICD-10-CM

## 2025-04-28 DIAGNOSIS — E78.2 MIXED HYPERLIPIDEMIA: ICD-10-CM

## 2025-04-28 DIAGNOSIS — E04.9 GOITER: ICD-10-CM

## 2025-04-28 DIAGNOSIS — E55.9 VITAMIN D DEFICIENCY: ICD-10-CM

## 2025-04-28 DIAGNOSIS — E03.9 ACQUIRED HYPOTHYROIDISM: ICD-10-CM

## 2025-04-28 DIAGNOSIS — E06.3 HASHIMOTO'S THYROIDITIS: ICD-10-CM

## 2025-04-28 RX ORDER — INSULIN LISPRO 100 [IU]/ML
INJECTION, SOLUTION INTRAVENOUS; SUBCUTANEOUS
Qty: 30 ML | Refills: 0 | Status: SHIPPED | OUTPATIENT
Start: 2025-04-28

## 2025-05-13 DIAGNOSIS — E06.3 HASHIMOTO'S THYROIDITIS: ICD-10-CM

## 2025-05-13 DIAGNOSIS — E03.9 ACQUIRED HYPOTHYROIDISM: ICD-10-CM

## 2025-05-14 RX ORDER — LEVOTHYROXINE SODIUM 75 MCG
75 TABLET ORAL
Qty: 90 TABLET | Refills: 0 | OUTPATIENT
Start: 2025-05-14

## 2025-05-19 DIAGNOSIS — E06.3 HASHIMOTO'S THYROIDITIS: ICD-10-CM

## 2025-05-19 DIAGNOSIS — E03.9 ACQUIRED HYPOTHYROIDISM: ICD-10-CM

## 2025-05-20 RX ORDER — LEVOTHYROXINE SODIUM 75 MCG
75 TABLET ORAL
Qty: 90 TABLET | Refills: 0 | Status: SHIPPED | OUTPATIENT
Start: 2025-05-20

## 2025-05-23 ENCOUNTER — TELEMEDICINE (OUTPATIENT)
Dept: ENDOCRINOLOGY | Age: 65
End: 2025-05-23

## 2025-05-23 DIAGNOSIS — R79.89 ELEVATED LIVER FUNCTION TESTS: ICD-10-CM

## 2025-05-23 DIAGNOSIS — E55.9 VITAMIN D DEFICIENCY: ICD-10-CM

## 2025-05-23 DIAGNOSIS — E11.21 DIABETIC NEPHROPATHY ASSOCIATED WITH TYPE 2 DIABETES MELLITUS (HCC): ICD-10-CM

## 2025-05-23 DIAGNOSIS — E04.9 GOITER: ICD-10-CM

## 2025-05-23 DIAGNOSIS — E66.3 OVERWEIGHT (BMI 25.0-29.9): ICD-10-CM

## 2025-05-23 DIAGNOSIS — I10 ESSENTIAL HYPERTENSION: ICD-10-CM

## 2025-05-23 DIAGNOSIS — E78.2 MIXED HYPERLIPIDEMIA: ICD-10-CM

## 2025-05-23 DIAGNOSIS — E06.3 HASHIMOTO'S THYROIDITIS: ICD-10-CM

## 2025-05-23 DIAGNOSIS — R93.1 AGATSTON CORONARY ARTERY CALCIUM SCORE BETWEEN 200 AND 399: ICD-10-CM

## 2025-05-23 DIAGNOSIS — E11.40 TYPE 2 DIABETES, CONTROLLED, WITH NEUROPATHY (HCC): Primary | ICD-10-CM

## 2025-05-23 DIAGNOSIS — E87.6 HYPOKALEMIA: ICD-10-CM

## 2025-05-23 DIAGNOSIS — E78.41 ELEVATED LIPOPROTEIN(A): ICD-10-CM

## 2025-05-23 DIAGNOSIS — M35.1 MCTD (MIXED CONNECTIVE TISSUE DISEASE): ICD-10-CM

## 2025-05-23 DIAGNOSIS — E03.9 ACQUIRED HYPOTHYROIDISM: ICD-10-CM

## 2025-05-23 RX ORDER — POTASSIUM CHLORIDE 1500 MG/1
20 TABLET, EXTENDED RELEASE ORAL DAILY
COMMUNITY

## 2025-05-23 RX ORDER — CHLORTHALIDONE 25 MG/1
25 TABLET ORAL EVERY OTHER DAY
COMMUNITY

## 2025-05-23 NOTE — PROGRESS NOTES
Simona Manriquez is a 65 y.o. female who presents virtually pfor Type 2 diabetes mellitus.    Current symptoms/problems include  leg weakness  and are unchanged.     1.  Type 2 diabetes, controlled, with neuropathy (HCC) [E11.40]      Diagnosed with Type 2 diabetes mellitus in 2004.     Comorbid conditions: Neuropathy  Previously Medtronic MiniMed insulin pump 780G  Omnipod was not covered by insurance.  Currently on Medtronic Minimed/Guardian     Current diabetic medications include: Humalog     Intolerance to diabetes medications: Yes Metformin     Patient is having a lot of stress.  Despite that, she manages her diabetes very well.  She got severe muscle weakness in her legs  Difficult to walk, worse  Stopped working     Weight trend: stable  Prior visit with dietician: yes  Current diet: on average, 3 meals per day  Current exercise: frequent     Current monitoring regimen: home blood tests - 10 times daily  Has brought blood glucose log/meter:  Yes  Home blood sugar records: fasting range:  and postprandial range:   Any episodes of hypoglycemia? Yes  Hypoglycemia frequency and time(s):  2 times a week  Does patient have Glucagon emergency kit? No  Does patient have rapid acting carbohydrate?  Yes  Does patient wear a medic alert bracelet or necklace?  No     2. Mixed hyperlipidemia  Has muscle pain.  Had problems with lipitor and crestor.     3. Hashimoto's thyroiditis  Has fatigue.     4. Goiter  No difficulty swallowing.     5. Vitamin D deficiency  Has fatigue.     6. Essential hypertension  Patient had severe angioedema and hives on lisinopril.  She was in the emergency room.  Currently she is on amlodipine, which is not effective.  Blood pressure significantly elevated.  Patient is having severe headache.     7.  Overweight  Eats healthy.   Tried low carb diet, eats healthy.     8. Hypothyroidism  Has fatigue     9. MCTD  Sees Rheumatologist  Has point pain, joint swelling, fatigue     10.

## 2025-06-26 ENCOUNTER — PATIENT MESSAGE (OUTPATIENT)
Dept: ENDOCRINOLOGY | Age: 65
End: 2025-06-26

## 2025-06-27 NOTE — TELEPHONE ENCOUNTER
Spoke with patient, advised to discussed with ENT and dental surgery/dentist further plan.  Patient complains of pain and dry mouth.  No improvement after antibiotics.  Lost weight because cannot eat as before.

## 2025-06-27 NOTE — TELEPHONE ENCOUNTER
I just wanted to let you know my paroitid and saliva glands have swelling on the left side. I started having problems getting food and medications swallowed. They are getting stuck at the top of my esophagus so I made an appointment with Dr. Rai an ENT who prescribed 6 day antibiotics and Cevimeline 25 mg. I did him know that my throat hurts all the up to my left ear (like an ear ache) and the doctor said my mouth wasn't producing enough saliva which is causing my problem.  I was wondering what you think about my eduarda and my  is worried because I have lost some more weight.

## 2025-07-07 ENCOUNTER — PATIENT MESSAGE (OUTPATIENT)
Dept: ENDOCRINOLOGY | Age: 65
End: 2025-07-07

## 2025-07-08 NOTE — TELEPHONE ENCOUNTER
Please advise patient that hemoglobin A1c, lipid panel and thyroid test were very good.  Please advised to discuss blood cell count and B12 test results with family doctor.

## 2025-08-17 DIAGNOSIS — E06.3 HASHIMOTO'S THYROIDITIS: ICD-10-CM

## 2025-08-17 DIAGNOSIS — E03.9 ACQUIRED HYPOTHYROIDISM: ICD-10-CM

## 2025-08-18 RX ORDER — LEVOTHYROXINE SODIUM 75 MCG
75 TABLET ORAL
Qty: 90 TABLET | Refills: 0 | Status: SHIPPED | OUTPATIENT
Start: 2025-08-18

## 2025-08-26 ENCOUNTER — OFFICE VISIT (OUTPATIENT)
Dept: ENDOCRINOLOGY | Age: 65
End: 2025-08-26
Payer: OTHER GOVERNMENT

## 2025-08-26 VITALS
HEART RATE: 68 BPM | DIASTOLIC BLOOD PRESSURE: 86 MMHG | WEIGHT: 185 LBS | OXYGEN SATURATION: 99 % | BODY MASS INDEX: 28.14 KG/M2 | SYSTOLIC BLOOD PRESSURE: 132 MMHG

## 2025-08-26 DIAGNOSIS — E66.3 OVERWEIGHT (BMI 25.0-29.9): ICD-10-CM

## 2025-08-26 DIAGNOSIS — E11.40 TYPE 2 DIABETES, CONTROLLED, WITH NEUROPATHY (HCC): Primary | ICD-10-CM

## 2025-08-26 DIAGNOSIS — E11.21 DIABETIC NEPHROPATHY ASSOCIATED WITH TYPE 2 DIABETES MELLITUS (HCC): ICD-10-CM

## 2025-08-26 DIAGNOSIS — E78.41 ELEVATED LIPOPROTEIN(A): ICD-10-CM

## 2025-08-26 DIAGNOSIS — M35.1 MCTD (MIXED CONNECTIVE TISSUE DISEASE): ICD-10-CM

## 2025-08-26 DIAGNOSIS — R93.1 AGATSTON CORONARY ARTERY CALCIUM SCORE BETWEEN 200 AND 399: ICD-10-CM

## 2025-08-26 DIAGNOSIS — E04.9 GOITER: ICD-10-CM

## 2025-08-26 DIAGNOSIS — R79.89 ELEVATED LIVER FUNCTION TESTS: ICD-10-CM

## 2025-08-26 DIAGNOSIS — E03.9 ACQUIRED HYPOTHYROIDISM: ICD-10-CM

## 2025-08-26 DIAGNOSIS — E06.3 HASHIMOTO'S THYROIDITIS: ICD-10-CM

## 2025-08-26 DIAGNOSIS — E55.9 VITAMIN D DEFICIENCY: ICD-10-CM

## 2025-08-26 DIAGNOSIS — I10 ESSENTIAL HYPERTENSION: ICD-10-CM

## 2025-08-26 DIAGNOSIS — E78.2 MIXED HYPERLIPIDEMIA: ICD-10-CM

## 2025-08-26 PROCEDURE — 3075F SYST BP GE 130 - 139MM HG: CPT | Performed by: INTERNAL MEDICINE

## 2025-08-26 PROCEDURE — 3079F DIAST BP 80-89 MM HG: CPT | Performed by: INTERNAL MEDICINE

## 2025-08-26 PROCEDURE — 3044F HG A1C LEVEL LT 7.0%: CPT | Performed by: INTERNAL MEDICINE

## 2025-08-26 PROCEDURE — 95251 CONT GLUC MNTR ANALYSIS I&R: CPT | Performed by: INTERNAL MEDICINE

## 2025-08-26 PROCEDURE — 99214 OFFICE O/P EST MOD 30 MIN: CPT | Performed by: INTERNAL MEDICINE

## 2025-08-26 RX ORDER — VIBEGRON 75 MG/1
TABLET, FILM COATED ORAL DAILY
COMMUNITY

## 2025-08-26 RX ORDER — POTASSIUM CHLORIDE 1.5 G/1.58G
20 POWDER, FOR SOLUTION ORAL 2 TIMES DAILY
COMMUNITY

## 2025-08-26 RX ORDER — CEVIMELINE HYDROCHLORIDE 30 MG/1
30 CAPSULE ORAL 3 TIMES DAILY
COMMUNITY